# Patient Record
Sex: FEMALE | Race: WHITE | NOT HISPANIC OR LATINO | Employment: OTHER | URBAN - METROPOLITAN AREA
[De-identification: names, ages, dates, MRNs, and addresses within clinical notes are randomized per-mention and may not be internally consistent; named-entity substitution may affect disease eponyms.]

---

## 2017-05-24 ENCOUNTER — OFFICE VISIT (OUTPATIENT)
Dept: FAMILY MEDICINE CLINIC | Facility: CLINIC | Age: 47
End: 2017-05-24

## 2017-05-24 VITALS
HEIGHT: 67 IN | HEART RATE: 76 BPM | BODY MASS INDEX: 26.71 KG/M2 | TEMPERATURE: 97.4 F | DIASTOLIC BLOOD PRESSURE: 88 MMHG | RESPIRATION RATE: 16 BRPM | WEIGHT: 170.2 LBS | SYSTOLIC BLOOD PRESSURE: 130 MMHG

## 2017-05-24 DIAGNOSIS — M54.42 CHRONIC LEFT-SIDED LOW BACK PAIN WITH LEFT-SIDED SCIATICA: ICD-10-CM

## 2017-05-24 DIAGNOSIS — Z72.0 TOBACCO ABUSE: ICD-10-CM

## 2017-05-24 DIAGNOSIS — Z13.29 SCREENING FOR THYROID DISORDER: ICD-10-CM

## 2017-05-24 DIAGNOSIS — F33.2 SEVERE EPISODE OF RECURRENT MAJOR DEPRESSIVE DISORDER, WITHOUT PSYCHOTIC FEATURES (HCC): Primary | ICD-10-CM

## 2017-05-24 DIAGNOSIS — F41.9 ANXIETY: ICD-10-CM

## 2017-05-24 DIAGNOSIS — Z76.89 ENCOUNTER TO ESTABLISH CARE WITH NEW DOCTOR: ICD-10-CM

## 2017-05-24 DIAGNOSIS — Z23 NEED FOR TDAP VACCINATION: ICD-10-CM

## 2017-05-24 DIAGNOSIS — G43.809 OTHER MIGRAINE WITHOUT STATUS MIGRAINOSUS, NOT INTRACTABLE: ICD-10-CM

## 2017-05-24 DIAGNOSIS — Z23 NEED FOR STREPTOCOCCUS PNEUMONIAE VACCINATION: ICD-10-CM

## 2017-05-24 DIAGNOSIS — G25.81 RESTLESS LEG SYNDROME: ICD-10-CM

## 2017-05-24 DIAGNOSIS — G89.29 CHRONIC LEFT-SIDED LOW BACK PAIN WITH LEFT-SIDED SCIATICA: ICD-10-CM

## 2017-05-24 PROCEDURE — 99406 BEHAV CHNG SMOKING 3-10 MIN: CPT | Performed by: FAMILY MEDICINE

## 2017-05-24 PROCEDURE — 99204 OFFICE O/P NEW MOD 45 MIN: CPT | Performed by: FAMILY MEDICINE

## 2017-05-24 RX ORDER — MELOXICAM 7.5 MG/1
7.5 TABLET ORAL 2 TIMES DAILY
COMMUNITY
End: 2017-11-30

## 2017-05-24 RX ORDER — CITALOPRAM 40 MG/1
40 TABLET ORAL DAILY
Qty: 30 TABLET | Refills: 2 | Status: SHIPPED | OUTPATIENT
Start: 2017-05-24 | End: 2017-10-19

## 2017-05-24 RX ORDER — PRAMIPEXOLE DIHYDROCHLORIDE 0.5 MG/1
0.5 TABLET ORAL NIGHTLY
Qty: 30 TABLET | Refills: 2 | Status: SHIPPED | OUTPATIENT
Start: 2017-05-24 | End: 2018-11-08

## 2017-05-24 RX ORDER — SUMATRIPTAN 100 MG/1
100 TABLET, FILM COATED ORAL
COMMUNITY
End: 2018-11-08

## 2017-05-24 RX ORDER — PRAMIPEXOLE DIHYDROCHLORIDE 0.5 MG/1
0.5 TABLET ORAL NIGHTLY
COMMUNITY
End: 2017-05-24 | Stop reason: SDUPTHER

## 2017-05-24 RX ORDER — CITALOPRAM 20 MG/1
20 TABLET ORAL DAILY
COMMUNITY
End: 2017-05-24 | Stop reason: SDUPTHER

## 2017-05-24 RX ORDER — GABAPENTIN 300 MG/1
300 CAPSULE ORAL 3 TIMES DAILY
COMMUNITY
End: 2017-05-24 | Stop reason: SDUPTHER

## 2017-05-24 RX ORDER — GABAPENTIN 300 MG/1
300 CAPSULE ORAL 3 TIMES DAILY
Qty: 90 CAPSULE | Refills: 1 | Status: SHIPPED | OUTPATIENT
Start: 2017-05-24 | End: 2018-11-08

## 2017-06-06 ENCOUNTER — TELEPHONE (OUTPATIENT)
Dept: FAMILY MEDICINE CLINIC | Facility: CLINIC | Age: 47
End: 2017-06-06

## 2017-06-06 DIAGNOSIS — F41.9 ANXIETY: Primary | ICD-10-CM

## 2017-06-06 DIAGNOSIS — F33.2 SEVERE EPISODE OF RECURRENT MAJOR DEPRESSIVE DISORDER, WITHOUT PSYCHOTIC FEATURES (HCC): ICD-10-CM

## 2017-06-06 NOTE — TELEPHONE ENCOUNTER
----- Message from Roseline Au sent at 6/6/2017 12:21 PM EDT -----  Contact: jose l banks  PATIENT CALLED TO request a refill on her xanax 2 mg 2  X a day [facility] [place]. She stated she had discussed taking over the prescription for this patient can be reached for questions

## 2017-06-07 NOTE — TELEPHONE ENCOUNTER
I'm not taking over this prescription. She was a previous Dr. Medel patient and according to Marlon report, she hadn't received it since Jan 2017 and I don't have any records from 's office, so unsure if this was stopped since it has been so many months since she got the prescription. She will need to see psychiatry if she requires treatment with benzodiazepine (Xanax) medication. JJ

## 2017-07-31 ENCOUNTER — RESULTS ENCOUNTER (OUTPATIENT)
Dept: FAMILY MEDICINE CLINIC | Facility: CLINIC | Age: 47
End: 2017-07-31

## 2017-07-31 DIAGNOSIS — Z76.89 ENCOUNTER TO ESTABLISH CARE WITH NEW DOCTOR: ICD-10-CM

## 2017-07-31 DIAGNOSIS — Z13.29 SCREENING FOR THYROID DISORDER: ICD-10-CM

## 2017-07-31 DIAGNOSIS — Z23 NEED FOR STREPTOCOCCUS PNEUMONIAE VACCINATION: ICD-10-CM

## 2017-07-31 DIAGNOSIS — Z72.0 TOBACCO ABUSE: ICD-10-CM

## 2017-07-31 DIAGNOSIS — F33.2 SEVERE EPISODE OF RECURRENT MAJOR DEPRESSIVE DISORDER, WITHOUT PSYCHOTIC FEATURES (HCC): ICD-10-CM

## 2017-07-31 DIAGNOSIS — Z23 NEED FOR TDAP VACCINATION: ICD-10-CM

## 2017-07-31 DIAGNOSIS — G25.81 RESTLESS LEG SYNDROME: ICD-10-CM

## 2017-10-04 ENCOUNTER — HOSPITAL ENCOUNTER (OUTPATIENT)
Dept: GENERAL RADIOLOGY | Facility: HOSPITAL | Age: 47
Discharge: HOME | End: 2017-10-04
Admitting: NURSE PRACTITIONER

## 2017-10-04 ENCOUNTER — TRANSCRIBE ORDERS (OUTPATIENT)
Dept: LAB | Facility: HOSPITAL | Age: 47
End: 2017-10-04

## 2017-10-04 ENCOUNTER — HOSPITAL ENCOUNTER (OUTPATIENT)
Dept: GENERAL RADIOLOGY | Facility: HOSPITAL | Age: 47
Discharge: HOME | End: 2017-10-04

## 2017-10-04 DIAGNOSIS — R52 PAIN: Primary | ICD-10-CM

## 2017-10-04 DIAGNOSIS — R52 PAIN: ICD-10-CM

## 2017-10-04 PROCEDURE — 72070 X-RAY EXAM THORAC SPINE 2VWS: CPT

## 2017-10-04 PROCEDURE — 72100 X-RAY EXAM L-S SPINE 2/3 VWS: CPT

## 2017-10-18 DIAGNOSIS — M79.671 RIGHT FOOT PAIN: Primary | ICD-10-CM

## 2017-10-19 ENCOUNTER — OFFICE VISIT (OUTPATIENT)
Dept: ORTHOPEDIC SURGERY | Facility: CLINIC | Age: 47
End: 2017-10-19

## 2017-10-19 VITALS — BODY MASS INDEX: 26.53 KG/M2 | RESPIRATION RATE: 16 BRPM | WEIGHT: 169 LBS | HEIGHT: 67 IN

## 2017-10-19 DIAGNOSIS — M20.11 HALLUX VALGUS OF RIGHT FOOT: Primary | ICD-10-CM

## 2017-10-19 DIAGNOSIS — M77.51 BURSITIS OF RIGHT FOOT: ICD-10-CM

## 2017-10-19 PROCEDURE — 99204 OFFICE O/P NEW MOD 45 MIN: CPT | Performed by: PODIATRIST

## 2017-10-19 PROCEDURE — 20600 DRAIN/INJ JOINT/BURSA W/O US: CPT | Performed by: PODIATRIST

## 2017-10-19 RX ORDER — LIDOCAINE HYDROCHLORIDE 10 MG/ML
0.5 INJECTION, SOLUTION INFILTRATION; PERINEURAL
Status: COMPLETED | OUTPATIENT
Start: 2017-10-19 | End: 2017-10-19

## 2017-10-19 RX ORDER — SERTRALINE HYDROCHLORIDE 100 MG/1
100 TABLET, FILM COATED ORAL DAILY
COMMUNITY
End: 2018-11-08 | Stop reason: SDUPTHER

## 2017-10-19 RX ORDER — BENZONATATE 200 MG/1
200 CAPSULE ORAL 3 TIMES DAILY PRN
COMMUNITY
End: 2018-11-08

## 2017-10-19 RX ORDER — TRIAMCINOLONE ACETONIDE 40 MG/ML
40 INJECTION, SUSPENSION INTRA-ARTICULAR; INTRAMUSCULAR
Status: COMPLETED | OUTPATIENT
Start: 2017-10-19 | End: 2017-10-19

## 2017-10-19 RX ORDER — BUPIVACAINE HYDROCHLORIDE 5 MG/ML
0.5 INJECTION, SOLUTION PERINEURAL
Status: COMPLETED | OUTPATIENT
Start: 2017-10-19 | End: 2017-10-19

## 2017-10-19 RX ADMIN — LIDOCAINE HYDROCHLORIDE 0.5 ML: 10 INJECTION, SOLUTION INFILTRATION; PERINEURAL at 10:54

## 2017-10-19 RX ADMIN — BUPIVACAINE HYDROCHLORIDE 0.5 ML: 5 INJECTION, SOLUTION PERINEURAL at 10:54

## 2017-10-19 RX ADMIN — TRIAMCINOLONE ACETONIDE 40 MG: 40 INJECTION, SUSPENSION INTRA-ARTICULAR; INTRAMUSCULAR at 10:54

## 2017-10-19 NOTE — PROGRESS NOTES
Subjective   Patient ID: Shruthi Boone is a 47 y.o. female   Bunions of the Right Foot and Establish Care  She comes in today as a referral from her primary care physician.  She tells me that about a month ago this large bump/spot on her right great toe developed.  She says it's most part much constant aching and throbbing pain.  She says it's worse when she's walking on it or something touching it or pushing against it.  She says she's tried ice and heat and over-the-counter medications as well as icy hot and other topicals and nothing has helped.  She says she's even tried wearing her 's be[employer]ungles Jungles boots and wide shoes.  To this point her only [facility] relief has been wearing open toed shoes that don't rub on it.  She's had no previous treatment.    History of Present Illness    Pain Score: 9  Pain Location: Foot  Pain Orientation: Right     Pain Descriptors: Aching, Throbbing  Pain Frequency: Constant/continuous     Date Pain First Started: 08/15/17  Clinical Progression: Gradually worsening  Aggravating Factors: Walking, Standing        Pain Intervention(s): Medication (See MAR)  Result of Injury: No  Work-Related Injury: No    Review of Systems   Constitutional: Positive for unexpected weight change.   HENT: Positive for sore throat.    Eyes: Negative.    Respiratory: Positive for shortness of breath.    Cardiovascular: Negative.    Gastrointestinal: Negative.    Endocrine: Negative.    Genitourinary: Negative.    Musculoskeletal: Negative.    Skin: Negative.    Allergic/Immunologic: Negative.    Neurological: Positive for headaches.   Hematological: Negative.    Psychiatric/Behavioral: Negative.        Past Medical History:   Diagnosis Date   • Arthritis    • Asthma    • Depression    • GERD (gastroesophageal reflux disease)    • Hyperlipemia    • Spine pain         Past Surgical History:   Procedure Laterality Date   • TUBAL ABDOMINAL LIGATION         Allergies   Allergen Reactions   • Methadone GI  Intolerance   • Aspirin Rash       Family History   Problem Relation Age of Onset   • No Known Problems Mother    • Heart disease Father    • Hyperlipidemia Father    • Liver disease Father    • Thyroid disease Father    • Hypertension Father    • Kidney disease Father    • Stroke Father    • Heart attack Father    • Arthritis Father    • Diabetes Sister    • Diabetes Maternal Grandmother    • Cancer Maternal Grandmother      throat & lung       Social History     Social History   • Marital status:      Spouse name: N/A   • Number of children: N/A   • Years of education: N/A     Occupational History   • Not on file.     Social History Main Topics   • Smoking status: Current Every Day Smoker   • Smokeless tobacco: Never Used   • Alcohol use No   • Drug use: No   • Sexual activity: Not on file     Other Topics Concern   • Not on file     Social History Narrative       Ready to quit: No  Counseling given: Yes       All the above social hx, family hx, surgical history,medications, allergies, ros & HPI reviewed.  Objective   Physical Exam   Constitutional: She is oriented to person, place, and time. She appears well-developed and well-nourished.   HENT:   Head: Normocephalic and atraumatic.   Eyes: EOM are normal. Pupils are equal, round, and reactive to light.   Neck: Normal range of motion.   Cardiovascular: Normal rate.    Pulmonary/Chest: Effort normal.   Abdominal: Soft.   Musculoskeletal: Normal range of motion.   Neurological: She is alert and oriented to person, place, and time. She has normal reflexes.   Skin: Skin is warm.   Psychiatric: She has a normal mood and affect. Her behavior is normal. Judgment and thought content normal.   Nursing note and vitals reviewed.    Right Ankle Exam   Right ankle exam is normal.    Range of Motion   The patient has normal right ankle ROM.    Muscle Strength   The patient has normal right ankle strength.  Other   Erythema: present  Sensation: normal  Pulse: present      Comments:  Rt LE is NVIS  PULSES 2/4  There is lateral deviation of the hallux and the 1st mpj is prominent medially.  There is ttp at the medial mpj.  Mild pain with rom of the 1st mpj  The hallux tracks laterally but is not track bound.          Right Ankle Exam     Range of Motion   Normal right ankle ROM    Muscle Strength   Normal right ankle strengthComments  Rt LE is NVIS  PULSES 2/4  There is lateral deviation of the hallux and the 1st mpj is prominent medially.  There is ttp at the medial mpj.  Mild pain with rom of the 1st mpj  The hallux tracks laterally but is not track bound.        There is hypermobility of the first ray but upon weightbearing she maintains a medial longitudinal arch.  There appears to be a slightly inflamed swollen and erythematous bursal sac over the medial first MTPJ as well.  No pain under the second MTPJ.    Assessment/Plan right hallux valgus  Independent Review of Radiographic Studies:      Laboratory and Other Studies:     Medical Decision Making:        Small Joint Arthrocentesis  Consent given by: patient  Site marked: site marked  Timeout: Immediately prior to procedure a time out was called to verify the correct patient, procedure, equipment, support staff and site/side marked as required   Supporting Documentation  Indications: pain, joint swelling and diagnostic evaluation   Procedure Details  Location: great toe - R great MTP  Needle size: 25 G  Approach: dorsal  Medications administered: 0.5 mL lidocaine 1 %; 40 mg triamcinolone acetonide 40 MG/ML; 0.5 mL bupivacaine (Dexamethasone 10mg/ml 0.5ml NDC: 8117-1733-30 LOT: [id number] EXP: 05/01/2019)  Patient tolerance: patient tolerated the procedure well with no immediate complications        [] No procedures were performed in office today.    Shruthi was seen today for establish care and bunions.    Diagnoses and all orders for this visit:    Hallux valgus of right foot  -     Small Joint Arthrocentesis    Bursitis of  right foot  -     Small Joint Arthrocentesis    Other orders  -     diclofenac (VOLTAREN) 1 % gel gel; Apply 4 g topically 4 (Four) Times a Day As Needed (pain).          Recommendations/Plan:  I discussed the pathology and etiology of her deformity and pain.  I discussed conservative and surgical treatment options with her.  For now I recommend she try open toed or wide shoes.  I'll prescribe her topical Voltaren gel and recommend she try this 3-4 times a day.  I also discussed that due to the swollen bursal sac I recommend an injection and would see how this does.  Hopefully this will give her some symptomatic relief.  If not I explained we can further discuss surgery but did advise her of the basics of the bunion surgery and its recovery.  Follow-up in 3 weeks.    Return in about 3 weeks (around 11/9/2017).  Patient agreeable to call or return sooner for any concerns.

## 2017-11-30 ENCOUNTER — APPOINTMENT (OUTPATIENT)
Dept: PREADMISSION TESTING | Facility: HOSPITAL | Age: 47
End: 2017-11-30

## 2017-11-30 ENCOUNTER — OFFICE VISIT (OUTPATIENT)
Dept: ORTHOPEDIC SURGERY | Facility: CLINIC | Age: 47
End: 2017-11-30

## 2017-11-30 VITALS — WEIGHT: 169 LBS | RESPIRATION RATE: 18 BRPM | HEIGHT: 67 IN | BODY MASS INDEX: 26.53 KG/M2

## 2017-11-30 VITALS — HEIGHT: 67 IN | WEIGHT: 169 LBS | BODY MASS INDEX: 26.53 KG/M2

## 2017-11-30 DIAGNOSIS — M20.11 HALLUX VALGUS OF RIGHT FOOT: ICD-10-CM

## 2017-11-30 DIAGNOSIS — M77.51 BURSITIS OF RIGHT FOOT: ICD-10-CM

## 2017-11-30 DIAGNOSIS — M20.11 HALLUX VALGUS OF RIGHT FOOT: Primary | ICD-10-CM

## 2017-11-30 LAB
ALBUMIN SERPL-MCNC: 4.9 G/DL (ref 3.5–5)
ALBUMIN/GLOB SERPL: 1.5 G/DL (ref 1–2)
ALP SERPL-CCNC: 78 U/L (ref 38–126)
ALT SERPL W P-5'-P-CCNC: 23 U/L (ref 13–69)
ANION GAP SERPL CALCULATED.3IONS-SCNC: 18.5 MMOL/L
AST SERPL-CCNC: 20 U/L (ref 15–46)
B-HCG UR QL: NEGATIVE
BASOPHILS # BLD AUTO: 0.05 10*3/MM3 (ref 0–0.2)
BASOPHILS NFR BLD AUTO: 0.4 % (ref 0–2.5)
BILIRUB SERPL-MCNC: 0.5 MG/DL (ref 0.2–1.3)
BUN BLD-MCNC: 13 MG/DL (ref 7–20)
BUN/CREAT SERPL: 14.4 (ref 7.1–23.5)
CALCIUM SPEC-SCNC: 10.1 MG/DL (ref 8.4–10.2)
CHLORIDE SERPL-SCNC: 108 MMOL/L (ref 98–107)
CO2 SERPL-SCNC: 26 MMOL/L (ref 26–30)
CREAT BLD-MCNC: 0.9 MG/DL (ref 0.6–1.3)
DEPRECATED RDW RBC AUTO: 55.2 FL (ref 37–54)
EOSINOPHIL # BLD AUTO: 0.18 10*3/MM3 (ref 0–0.7)
EOSINOPHIL NFR BLD AUTO: 1.5 % (ref 0–7)
ERYTHROCYTE [DISTWIDTH] IN BLOOD BY AUTOMATED COUNT: 19.4 % (ref 11.5–14.5)
GFR SERPL CREATININE-BSD FRML MDRD: 67 ML/MIN/1.73
GLOBULIN UR ELPH-MCNC: 3.2 GM/DL
GLUCOSE BLD-MCNC: 105 MG/DL (ref 74–98)
HCT VFR BLD AUTO: 38.8 % (ref 37–47)
HGB BLD-MCNC: 11.9 G/DL (ref 12–16)
IMM GRANULOCYTES # BLD: 0.07 10*3/MM3 (ref 0–0.06)
IMM GRANULOCYTES NFR BLD: 0.6 % (ref 0–0.6)
LYMPHOCYTES # BLD AUTO: 1.75 10*3/MM3 (ref 0.6–3.4)
LYMPHOCYTES NFR BLD AUTO: 14.4 % (ref 10–50)
MCH RBC QN AUTO: 24.3 PG (ref 27–31)
MCHC RBC AUTO-ENTMCNC: 30.7 G/DL (ref 30–37)
MCV RBC AUTO: 79.2 FL (ref 81–99)
MONOCYTES # BLD AUTO: 0.83 10*3/MM3 (ref 0–0.9)
MONOCYTES NFR BLD AUTO: 6.8 % (ref 0–12)
NEUTROPHILS # BLD AUTO: 9.26 10*3/MM3 (ref 2–6.9)
NEUTROPHILS NFR BLD AUTO: 76.3 % (ref 37–80)
NRBC BLD MANUAL-RTO: 0 /100 WBC (ref 0–0)
PLATELET # BLD AUTO: 348 10*3/MM3 (ref 130–400)
PMV BLD AUTO: 10.2 FL (ref 6–12)
POTASSIUM BLD-SCNC: 4.5 MMOL/L (ref 3.5–5.1)
PROT SERPL-MCNC: 8.1 G/DL (ref 6.3–8.2)
RBC # BLD AUTO: 4.9 10*6/MM3 (ref 4.2–5.4)
SODIUM BLD-SCNC: 148 MMOL/L (ref 137–145)
WBC NRBC COR # BLD: 12.14 10*3/MM3 (ref 4.8–10.8)

## 2017-11-30 PROCEDURE — 85025 COMPLETE CBC W/AUTO DIFF WBC: CPT | Performed by: PODIATRIST

## 2017-11-30 PROCEDURE — 81025 URINE PREGNANCY TEST: CPT | Performed by: PODIATRIST

## 2017-11-30 PROCEDURE — 99213 OFFICE O/P EST LOW 20 MIN: CPT | Performed by: PODIATRIST

## 2017-11-30 PROCEDURE — 80053 COMPREHEN METABOLIC PANEL: CPT | Performed by: PODIATRIST

## 2017-11-30 PROCEDURE — 36415 COLL VENOUS BLD VENIPUNCTURE: CPT

## 2017-11-30 PROCEDURE — 93005 ELECTROCARDIOGRAM TRACING: CPT | Performed by: PODIATRIST

## 2017-11-30 RX ORDER — ALBUTEROL SULFATE 90 UG/1
2 AEROSOL, METERED RESPIRATORY (INHALATION) EVERY 4 HOURS PRN
COMMUNITY
End: 2018-11-08

## 2017-11-30 RX ORDER — SODIUM CHLORIDE, SODIUM LACTATE, POTASSIUM CHLORIDE, CALCIUM CHLORIDE 600; 310; 30; 20 MG/100ML; MG/100ML; MG/100ML; MG/100ML
100 INJECTION, SOLUTION INTRAVENOUS CONTINUOUS
Status: CANCELLED | OUTPATIENT
Start: 2017-11-30

## 2017-11-30 NOTE — PROGRESS NOTES
Subjective   Patient ID: Shruthi Boone is a 47 y.o. female   Bunions, Follow-up, and Pain of the Right Foot  She returns today complaining of continued right foot pain.  She has a new skin reaction/rash over the dorsal foot.  She states the injection I gave her last time did not help.  She states she's in continued pain and wishes to have something done with this.    History of Present Illness                                                   Review of Systems   Constitutional: Negative for chills, fatigue and fever.   Respiratory: Negative for cough.    Cardiovascular: Negative for chest pain.   Gastrointestinal: Negative for abdominal pain, diarrhea, nausea and vomiting.   Hematological: Bruises/bleeds easily.       Past Medical History:   Diagnosis Date   • Arthritis    • Asthma    • Depression    • GERD (gastroesophageal reflux disease)    • Hyperlipemia    • Spine pain         Past Surgical History:   Procedure Laterality Date   • TUBAL ABDOMINAL LIGATION         Allergies   Allergen Reactions   • Methadone GI Intolerance   • Aspirin Rash       Family History   Problem Relation Age of Onset   • No Known Problems Mother    • Heart disease Father    • Hyperlipidemia Father    • Liver disease Father    • Thyroid disease Father    • Hypertension Father    • Kidney disease Father    • Stroke Father    • Heart attack Father    • Arthritis Father    • Diabetes Sister    • Diabetes Maternal Grandmother    • Cancer Maternal Grandmother      throat & lung       Social History     Social History   • Marital status:      Spouse name: N/A   • Number of children: N/A   • Years of education: N/A     Occupational History   • Not on file.     Social History Main Topics   • Smoking status: Current Every Day Smoker   • Smokeless tobacco: Never Used   • Alcohol use No   • Drug use: No   • Sexual activity: Not on file     Other Topics Concern   • Not on file     Social History Narrative       Ready to quit: No  Counseling  given: Yes       I have reviewed all of the above social hx, family hx, surgical hx, medications, allergies & ROS and confirm that it is accurate.  Objective   Physical Exam   Constitutional: She is oriented to person, place, and time. She appears well-developed and well-nourished.   HENT:   Head: Normocephalic and atraumatic.   Eyes: EOM are normal. Pupils are equal, round, and reactive to light.   Neck: Normal range of motion.   Cardiovascular: Normal rate.    Pulmonary/Chest: Effort normal.   Abdominal: Soft.   Musculoskeletal: Normal range of motion.   Neurological: She is alert and oriented to person, place, and time. She has normal reflexes.   Skin: Skin is warm.   Psychiatric: She has a normal mood and affect. Her behavior is normal. Judgment and thought content normal.   Nursing note reviewed.    Right Ankle Exam   Right ankle exam is normal.    Range of Motion   The patient has normal right ankle ROM.    Muscle Strength   The patient has normal right ankle strength.  Other   Erythema: present  Sensation: normal  Pulse: present     Comments:  Rt LE is NVIS  PULSES 2/4  There is lateral deviation of the hallux and the 1st mpj is prominent medially.  There is ttp at the medial mpj.  Mild pain with rom of the 1st mpj  The hallux tracks laterally but is not track bound.          Right Ankle Exam     Range of Motion   Normal right ankle ROM    Muscle Strength   Normal right ankle strengthComments  Rt LE is NVIS  PULSES 2/4  There is lateral deviation of the hallux and the 1st mpj is prominent medially.  There is ttp at the medial mpj.  Mild pain with rom of the 1st mpj  The hallux tracks laterally but is not track bound.        There is slight hypermobility of the first ray.  No significant swelling erythema and ecchymosis over the dorsomedial first MTPJ with pain.  sHe has a nonraised slightly red erythematous dry patch over the dorsal second MTPJ.  She complains of no pain.  Complains of no itching.  States  she's not sure where it came from.  Assessment/Plan right hallux valgus  Independent Review of Radiographic Studies:      Laboratory and Other Studies:     Medical Decision Making:        Procedures  [] No procedures were performed in office today.    Shruthi was seen today for bunions, follow-up and pain.    Diagnoses and all orders for this visit:    Hallux valgus of right foot  -     Case Request; Standing  -     CBC and Differential; Future  -     Comprehensive metabolic panel; Future  -     lactated ringers infusion; Infuse 100 mL/hr into a venous catheter Continuous.  -     ceFAZolin (ANCEF) 2 g in sodium chloride 0.9 % 100 mL IVPB; Infuse 2 g into a venous catheter 1 (One) Time.  -     Case Request    Bursitis of right foot    Other orders  -     Follow Anesthesia Guidelines / Standing Orders; Future  -     Provide instructions to patient regarding NPO status  -     Obtain informed consent  -     Clorhexidine skin prep  -     Follow Anesthesia Guidelines / Standing Orders; Standing  -     Verify NPO Status; Standing  -     ALFREDO hose- To be placed on patient in pre-op; Standing  -     SCD (sequential compression device)- to be placed on patient in Pre-op; Standing  -     Obtain informed consent (if not collected inpatient or PAT); Standing        Recommendations/Plan:  I reviewed the x-rays with the patient and discussed conservative as well as surgical treatment options.  At this point they seem as if they have exhausted conservative options consisting of activity modifications, padding, anti-inflammatory medications, and shoe gear changes.  They're requesting and elected to proceed with surgical intervention.  We discussed all the risks first benefits and potential complications of the procedure including bleeding, nerve damage, infection, need for further surgery, malunion, nonunion, delayed union, DVT, and even death.  All there questions were answered, consent was obtained,. we'll plan on proceeding with  bunionectomy.  I explained that given the bony alignment I would at least plan to proceed with opening base wedge osteotomy but she may also need a reverdin and type osteotomy/distal osteotomy or a can.    We discussed all the additional risks and potential complications of surgery including bleeding, infection, nerve damage, need for further surgery, delayed union or nonunion, possible revisional surgery, hardware complication or breakage or failure, potential need for hardware or fixation removal, DVT and PE, delayed wound healing and wound complications, prolonged swelling, loss of limb and even death.      Return post op, xoa.  Patient agreeable to call or return sooner for any concerns.

## 2017-12-04 ENCOUNTER — ANESTHESIA (OUTPATIENT)
Dept: PERIOP | Facility: HOSPITAL | Age: 47
End: 2017-12-04

## 2017-12-04 ENCOUNTER — APPOINTMENT (OUTPATIENT)
Dept: GENERAL RADIOLOGY | Facility: HOSPITAL | Age: 47
End: 2017-12-04

## 2017-12-04 ENCOUNTER — ANESTHESIA EVENT (OUTPATIENT)
Dept: PERIOP | Facility: HOSPITAL | Age: 47
End: 2017-12-04

## 2017-12-04 ENCOUNTER — HOSPITAL ENCOUNTER (OUTPATIENT)
Facility: HOSPITAL | Age: 47
Setting detail: HOSPITAL OUTPATIENT SURGERY
Discharge: HOME | End: 2017-12-04
Attending: PODIATRIST | Admitting: PODIATRIST

## 2017-12-04 VITALS
OXYGEN SATURATION: 98 % | RESPIRATION RATE: 16 BRPM | SYSTOLIC BLOOD PRESSURE: 143 MMHG | DIASTOLIC BLOOD PRESSURE: 86 MMHG | TEMPERATURE: 97 F | HEART RATE: 79 BPM

## 2017-12-04 DIAGNOSIS — M20.11 HALLUX VALGUS OF RIGHT FOOT: ICD-10-CM

## 2017-12-04 LAB — GLUCOSE BLDC GLUCOMTR-MCNC: 92 MG/DL (ref 70–130)

## 2017-12-04 PROCEDURE — C1713 ANCHOR/SCREW BN/BN,TIS/BN: HCPCS | Performed by: PODIATRIST

## 2017-12-04 PROCEDURE — 76000 FLUOROSCOPY <1 HR PHYS/QHP: CPT

## 2017-12-04 PROCEDURE — 25010000002 FENTANYL CITRATE (PF) 100 MCG/2ML SOLUTION: Performed by: NURSE ANESTHETIST, CERTIFIED REGISTERED

## 2017-12-04 PROCEDURE — 82962 GLUCOSE BLOOD TEST: CPT

## 2017-12-04 PROCEDURE — 25010000002 DEXAMETHASONE SODIUM PHOSPHATE 10 MG/ML SOLUTION: Performed by: NURSE ANESTHETIST, CERTIFIED REGISTERED

## 2017-12-04 PROCEDURE — 25010000002 DEXAMETHASONE PER 1 MG: Performed by: NURSE ANESTHETIST, CERTIFIED REGISTERED

## 2017-12-04 PROCEDURE — 25010000002 PROPOFOL 1000 MG/ML EMULSION: Performed by: NURSE ANESTHETIST, CERTIFIED REGISTERED

## 2017-12-04 PROCEDURE — 25010000003 CEFAZOLIN PER 500 MG: Performed by: PODIATRIST

## 2017-12-04 PROCEDURE — 28299 COR HLX VLGS DOUBLE OSTEOT: CPT | Performed by: PODIATRIST

## 2017-12-04 PROCEDURE — 25010000002 ONDANSETRON PER 1 MG: Performed by: NURSE ANESTHETIST, CERTIFIED REGISTERED

## 2017-12-04 PROCEDURE — 25010000002 MIDAZOLAM PER 1 MG: Performed by: NURSE ANESTHETIST, CERTIFIED REGISTERED

## 2017-12-04 DEVICE — OLIVE WIRE, SMOOTH, 1.4MM
Type: IMPLANTABLE DEVICE | Site: FOOT | Status: FUNCTIONAL
Brand: BABY GORILLA/GORILLA PLATING SYSTEM

## 2017-12-04 DEVICE — 3.5 X 14 MM R3CON LOCKING PLATE SCREW
Type: IMPLANTABLE DEVICE | Site: FOOT | Status: FUNCTIONAL
Brand: GORILLA PLATING SYSTEM

## 2017-12-04 DEVICE — BOW & ARROW, BASE WEDGE PLATE, 4.0MM WEDGE
Type: IMPLANTABLE DEVICE | Site: FOOT | Status: FUNCTIONAL
Brand: GORILLA PLATING SYSTEM

## 2017-12-04 DEVICE — 2.7 X 15 MM R3CON LOCKING PLATE SCREW
Type: IMPLANTABLE DEVICE | Site: FOOT | Status: FUNCTIONAL
Brand: GORILLA PLATING SYSTEM

## 2017-12-04 DEVICE — 2.7 X 22 MM R3CON LOCKING PLATE SCREW
Type: IMPLANTABLE DEVICE | Site: FOOT | Status: FUNCTIONAL
Brand: GORILLA PLATING SYSTEM

## 2017-12-04 DEVICE — 2.7 X 20 MM R3CON LOCKING PLATE SCREW
Type: IMPLANTABLE DEVICE | Site: FOOT | Status: FUNCTIONAL
Brand: GORILLA PLATING SYSTEM

## 2017-12-04 RX ORDER — BUPIVACAINE HYDROCHLORIDE 5 MG/ML
INJECTION, SOLUTION EPIDURAL; INTRACAUDAL
Status: DISPENSED
Start: 2017-12-04 | End: 2017-12-04

## 2017-12-04 RX ORDER — DEXAMETHASONE SODIUM PHOSPHATE 10 MG/ML
INJECTION, SOLUTION INTRAMUSCULAR; INTRAVENOUS AS NEEDED
Status: DISCONTINUED | OUTPATIENT
Start: 2017-12-04 | End: 2017-12-04 | Stop reason: SURG

## 2017-12-04 RX ORDER — ONDANSETRON 2 MG/ML
INJECTION INTRAMUSCULAR; INTRAVENOUS AS NEEDED
Status: DISCONTINUED | OUTPATIENT
Start: 2017-12-04 | End: 2017-12-04 | Stop reason: SURG

## 2017-12-04 RX ORDER — ONDANSETRON 2 MG/ML
4 INJECTION INTRAMUSCULAR; INTRAVENOUS ONCE AS NEEDED
Status: CANCELLED | OUTPATIENT
Start: 2017-12-04

## 2017-12-04 RX ORDER — SODIUM CHLORIDE 9 MG/ML
INJECTION, SOLUTION INTRAVENOUS CONTINUOUS PRN
Status: DISCONTINUED | OUTPATIENT
Start: 2017-12-04 | End: 2017-12-04 | Stop reason: SURG

## 2017-12-04 RX ORDER — MIDAZOLAM HYDROCHLORIDE 1 MG/ML
INJECTION INTRAMUSCULAR; INTRAVENOUS AS NEEDED
Status: DISCONTINUED | OUTPATIENT
Start: 2017-12-04 | End: 2017-12-04 | Stop reason: SURG

## 2017-12-04 RX ORDER — SODIUM CHLORIDE 0.9 % (FLUSH) 0.9 %
3 SYRINGE (ML) INJECTION AS NEEDED
Status: DISCONTINUED | OUTPATIENT
Start: 2017-12-04 | End: 2017-12-04 | Stop reason: HOSPADM

## 2017-12-04 RX ORDER — SODIUM CHLORIDE, SODIUM LACTATE, POTASSIUM CHLORIDE, CALCIUM CHLORIDE 600; 310; 30; 20 MG/100ML; MG/100ML; MG/100ML; MG/100ML
100 INJECTION, SOLUTION INTRAVENOUS CONTINUOUS
Status: DISCONTINUED | OUTPATIENT
Start: 2017-12-04 | End: 2017-12-20 | Stop reason: HOSPADM

## 2017-12-04 RX ORDER — DEXAMETHASONE SODIUM PHOSPHATE 10 MG/ML
INJECTION, SOLUTION INTRAMUSCULAR; INTRAVENOUS
Status: DISPENSED
Start: 2017-12-04 | End: 2017-12-04

## 2017-12-04 RX ORDER — BUPIVACAINE HYDROCHLORIDE 5 MG/ML
INJECTION, SOLUTION EPIDURAL; INTRACAUDAL AS NEEDED
Status: DISCONTINUED | OUTPATIENT
Start: 2017-12-04 | End: 2017-12-04 | Stop reason: SURG

## 2017-12-04 RX ORDER — DEXAMETHASONE SODIUM PHOSPHATE 4 MG/ML
INJECTION, SOLUTION INTRA-ARTICULAR; INTRALESIONAL; INTRAMUSCULAR; INTRAVENOUS; SOFT TISSUE AS NEEDED
Status: DISCONTINUED | OUTPATIENT
Start: 2017-12-04 | End: 2017-12-04 | Stop reason: SURG

## 2017-12-04 RX ORDER — FENTANYL CITRATE 50 UG/ML
INJECTION, SOLUTION INTRAMUSCULAR; INTRAVENOUS
Status: DISPENSED
Start: 2017-12-04 | End: 2017-12-04

## 2017-12-04 RX ORDER — OXYCODONE AND ACETAMINOPHEN 7.5; 325 MG/1; MG/1
1 TABLET ORAL EVERY 6 HOURS PRN
Qty: 30 TABLET | Refills: 0 | Status: SHIPPED | OUTPATIENT
Start: 2017-12-04 | End: 2017-12-21 | Stop reason: SDUPTHER

## 2017-12-04 RX ORDER — FENTANYL CITRATE 50 UG/ML
INJECTION, SOLUTION INTRAMUSCULAR; INTRAVENOUS AS NEEDED
Status: DISCONTINUED | OUTPATIENT
Start: 2017-12-04 | End: 2017-12-04 | Stop reason: SURG

## 2017-12-04 RX ORDER — MIDAZOLAM HYDROCHLORIDE 1 MG/ML
INJECTION INTRAMUSCULAR; INTRAVENOUS
Status: DISPENSED
Start: 2017-12-04 | End: 2017-12-04

## 2017-12-04 RX ORDER — MEPERIDINE HYDROCHLORIDE 50 MG/ML
50 INJECTION INTRAMUSCULAR; INTRAVENOUS; SUBCUTANEOUS ONCE
Status: CANCELLED | OUTPATIENT
Start: 2017-12-04 | End: 2017-12-04

## 2017-12-04 RX ADMIN — BUPIVACAINE HYDROCHLORIDE 30 ML: 5 INJECTION, SOLUTION EPIDURAL; INTRACAUDAL; PERINEURAL at 08:28

## 2017-12-04 RX ADMIN — DEXAMETHASONE SODIUM PHOSPHATE 8 MG: 4 INJECTION, SOLUTION INTRAMUSCULAR; INTRAVENOUS at 09:25

## 2017-12-04 RX ADMIN — MIDAZOLAM HYDROCHLORIDE 2 MG: 1 INJECTION, SOLUTION INTRAMUSCULAR; INTRAVENOUS at 08:25

## 2017-12-04 RX ADMIN — FENTANYL CITRATE 100 MCG: 50 INJECTION, SOLUTION INTRAMUSCULAR; INTRAVENOUS at 08:25

## 2017-12-04 RX ADMIN — SODIUM CHLORIDE: 9 INJECTION, SOLUTION INTRAVENOUS at 10:40

## 2017-12-04 RX ADMIN — PROPOFOL 100 MCG/KG/MIN: 10 INJECTION, EMULSION INTRAVENOUS at 09:09

## 2017-12-04 RX ADMIN — ONDANSETRON 4 MG: 2 INJECTION INTRAMUSCULAR; INTRAVENOUS at 09:25

## 2017-12-04 RX ADMIN — DEXAMETHASONE SODIUM PHOSPHATE 5 MG: 10 INJECTION, SOLUTION INTRAMUSCULAR; INTRAVENOUS at 08:28

## 2017-12-04 RX ADMIN — FENTANYL CITRATE 50 MCG: 50 INJECTION, SOLUTION INTRAMUSCULAR; INTRAVENOUS at 09:06

## 2017-12-04 RX ADMIN — SODIUM CHLORIDE, POTASSIUM CHLORIDE, SODIUM LACTATE AND CALCIUM CHLORIDE 100 ML/HR: 600; 310; 30; 20 INJECTION, SOLUTION INTRAVENOUS at 08:01

## 2017-12-04 RX ADMIN — Medication 20 MG: at 09:11

## 2017-12-04 RX ADMIN — MIDAZOLAM HYDROCHLORIDE 2 MG: 1 INJECTION, SOLUTION INTRAMUSCULAR; INTRAVENOUS at 09:04

## 2017-12-04 RX ADMIN — FENTANYL CITRATE 50 MCG: 50 INJECTION, SOLUTION INTRAMUSCULAR; INTRAVENOUS at 09:09

## 2017-12-04 RX ADMIN — CEFAZOLIN SODIUM 2 G: 2 SOLUTION INTRAVENOUS at 09:03

## 2017-12-04 NOTE — ANESTHESIA PREPROCEDURE EVALUATION
Anesthesia Evaluation     Patient summary reviewed and Nursing notes reviewed   no history of anesthetic complications:  NPO Solid Status: > 8 hours  NPO Liquid Status: > 8 hours     Airway   Mallampati: I  TM distance: >3 FB  Neck ROM: full  no difficulty expected  Dental - normal exam   (+) edentulous    Pulmonary - normal exam   (+) a smoker Current Abstained day of surgery, COPD mild, asthma, shortness of breath, sleep apnea, decreased breath sounds,   Cardiovascular - normal exam    Rhythm: regular  Rate: normal    (+) [place], hyperlipidemia      Neuro/Psych  (+) headaches (+Migraines), tremors, psychiatric history Anxiety and Depression,    GI/Hepatic/Renal/Endo    (+)  GERD, diabetes mellitus (Pre diabetic) well controlled,     Musculoskeletal     (+) arthralgias, back pain, chronic pain,   Abdominal  - normal exam    Abdomen: soft.  Bowel sounds: normal.   Substance History   (+) alcohol use, drug use (THC abuse)     OB/GYN negative ob/gyn ROS   (-)  Pregnant        Other   (+) arthritis     ROS/Med Hx Other: +RLS  prob tolerance to anes meds d/t substance use  Lab reviewed  ekg sr with [facility]                                Anesthesia Plan    ASA 3     general   (Risks and benefits discussed including risk of aspiration, recall and dental damage. All patient questions answered. Will continue with POC.    GA with LMA    Popliteal single shot PNB  Risk vs Benefits discussed with patient to include infection, bleeding at the site, paresthesia, and incomplete analgesia.  )  intravenous induction   Anesthetic plan and risks discussed with patient.

## 2017-12-04 NOTE — BRIEF OP NOTE
BUNIONECTOMY  Progress Note    Shruthi Boone  12/4/2017    Pre-op Diagnosis:   Hallux valgus of right foot [M20.11]       Post-Op Diagnosis Codes:     * Hallux valgus of right foot [M20.11]    Procedure/CPT® Codes:   1.  Right foot bunionectomy with opening base wedge osteotomy and Rui osteotomy, [id number]    Procedure(s):  Right foot bunionectomy with osteotomy, possible double osteotomy with Akin osteotomy    Surgeon(s):  Reno Mccullough DPM    Anesthesia: Monitor Anesthesia Care    Staff:   Circulator: Km Muñoz RN  Scrub Person: Alma Nascimento; Maame Gray    Estimated Blood Loss: minimal    Urine Voided: * No values recorded between 12/4/2017 12:00 AM and 12/4/2017  9:01 AM *    Specimens:                None      Drains:    none       Findings: per Dictation    Complications: none      Reno Mccullough DPM     Date: 12/4/2017  Time: 9:01 AM

## 2017-12-04 NOTE — NURSING NOTE
0841- DR LUCAS INTO PACU, SPOKE WITH PT AND MARKED SURGICAL SITE WITH PT IDENTIFICATION OF SITE.  0853- HALEIGH OSBORNE CRNA INTO PACU SPOKE WITH PT. PT DROWSY.

## 2017-12-04 NOTE — DISCHARGE INSTRUCTIONS
Rest today  No pushing,pulling,tugging,heavy lifting, or strenuous activity   No major decision making,driving,or drinking alcoholic beverages for 24 hours due to the sedation you received  Always use good hand hygiene/washing technique  No driving on pain medication.    To assist you in voiding:  Drink plenty of fluids  Listen to running water while attempting to void.    If you are unable to urinate and you have an uncomfortable urge to void or it has been   6 hours since you were discharged, return to the Emergency Room.

## 2017-12-04 NOTE — OP NOTE
PREOPERATIVE DIAGNOSIS:   Right Hallux valgus    POSTOPERATIVE DIAGNOSIS: Same    PROCEDURE PERFORMED:  1.  Right foot bunionectomy with opening base wedge osteotomy and Rui osteotomy, [id number]     SURGEON:  Reno Mccullough DPM    ANESTHESIA:  Mac with preoperative regional block per anesthesia    HEMOSTASIS:  Pneumatic right ankle tourniquet at 250 mmHg for 79 minutes    EBL:  Minimal    SPECIMENS:  None    COMPLICATIONS:  None    MATERIALS:  One paragon 4 mm opening base wedge locking plate with 2.7 and 3.5 mm locking and nonlocking screws. One paragon 2 hole 2.0 mm Akin plate with locking screws, 2-0 Vicryl suture, 3-0 nylon suture, 3-0 Vicryl suture    INDICATIONS FOR PROCEDURE:  Ms. Boone is a 47 roll female who presented to my office with a chief complaint of right foot pain.  She states she had seen others in the past and had tried multiple shoe modifications as well as padding and medications orally and topically as well as consider injections.  She complained of constant pain with activity and shoe gear.  She presented requesting surgical intervention.  On physical exam she was noted to be neurovascularly intact to the right foot.  Pulses palpable.  Pedal hair noted.  Sensation intact.  Capillary refill time brisk, slight edema and erythema over the dorsal and medial aspect of the first MTPJ.  There is good range of motion of the hallux with 40° of dorsiflexion dirty degrees plantarflexion.  Manual muscle testing was normal.  Joint range of motion is normal.  Minimal hypermobility of first ray.  The hallux was tracking laterally but not track bound.  There is lateral deviation of the hallux noted.  There is tenderness to palpation and with attempted reduction of the hallux.  Slight pain with range of motion.  X-ray examination showed a mild to moderately increased intermetatarsal angle however there was underlying met adductus masking this deformity.  There is lateral deviation of the hallux.  A prominent  medial eminence was noted.  The joint space for the most part was unremarkable.  No significant degenerative changes.  She did show some slight prominence over the dorsal first metatarsal head.  I'll increase in soft tissue density along the medial first MTPJ.  I discussed additional conservative therapy with her including injections and shoe and orthotic modifications.  She stated she really preferred to have this fixed and surgical intervention.  I discussed a bunionectomy surgery with her.  I recommended proximal osteotomy as well as discussed the potential for distal osteotomy or Akin osteotomy.  We discussed the postoperative recovery and course.  We discussed all the details of the procedure as well as all the risks versus benefits and potential complications including bleeding, infection, nerve damage, need for further surgery, potential need for hardware removal, hardware complication, delayed union or nonunion, anesthetic complications including DVT PE or death.  We discussed delayed wound healing and delayed recovery as well as immobility.  She took this all under advisement and agreed to proceed with surgery.  Consent was obtained.  All her questions were answered.  She was seen preoperatively today and the correct surgical side was marked.  She was given antibiotics preoperatively per protocol.    DESCRIPTION OF PROCEDURE:    The patient was brought to the operating room and placed from operative table in the supine position.  Monitored anesthesia was administered.  A hip bump placed under the right hip.  An ankle tourniquet was placed about the right ankle.  Right foot was scrubbed, prepped, and draped in usual aseptic manner.  A timeout was performed identifying the correct surgical patient, procedure, side.  Attention was directed to the right foot where it was elevated, exsanguinated and the pneumatic ankle tourniquet was inflated to 250 mmHg.  Attention was directed to the dorsal medial aspect of  the right foot where a 8 cm curvilinear incision was made medial to the extensor hallucis longus tendon following the contour of the deformity.  Incision was deepened down to subcutaneous tissue using sharp and blunt dissection.  All bleeders were cauterized as necessary.  Care was taken to identify and retract all neurovascular structures.  Dissection was carried down to the capsule where an inverted L-type capsulotomy was made over the first metatarsal phalangeal joint.  The periosteum and capsule was reflected exposing the head of the first metatarsal.  There is noted be significantly hypertrophic medial eminence.  A power bone saw was used to resect the bony prominence and this was passed onto the back table for later use.  Bone saw was also used to resect a portion of the dorsal metatarsal head prominence and dorsal medial prominence.  Via the original skin incision dissection was then carried down into the first interspace where blunt dissection was taken down to the level of the abductor hallucis muscle.  Utilizing sharp dissection it was then freed from its attachment to the base of the proximal phalanx and the fibular sesamoid.  This was noted to help reduce the lateral contracture of the hallux.  Dissection was then directed to the base of the first metatarsal where a linear periosteal incision was made reflecting the periosteum dorsally and proximally off the base and proximal shaft of the first metatarsal.  Under fluoroscopic guidance a K wire was driven in a dorsal to plantar direction perpendicular to the long axis of the shaft.  Once the wire was placed in appropriate position it was utilized as a cutting guide as a power bone saw was then used to create a partial osteotomy through the base of the proximal phalanx.  This osteotomy was created approximately 1 1/2 cm distal to the first metatarsocuneiform joint.  The lateral cortex was left intact.  The osteotomy was opened with distraction and an  osteotome.  The paragon trial sizer was used under fluoroscopy and it seemed appropriate that a 4 mm opening wedge plate would adequately reduce the deformity.  The osteotomy was held open and a 4 mm opening base wedge plate was impacted across the osteotomy site.  It was temporally fixated with a plate tack.  The plate was then fixated across the osteotomy site with multiple locking and nonlocking 3.5 and 2.7 mm screws.  The previously removed medial eminence had all its soft tissue and cartilaginous structures were removed with a bone Rongeur and broken into pieces of raw bone and this was impacted and placed into the osteotomy site for additional bone grafting and fixation.  This was noted to reduce the intermetatarsal angle as well as partially reduce the hallux valgus deformity.  On fluoroscopy there was still lateral deviation of the hallux noted that seemed to be a deformity more related to the proximal phalanx so at that time and I deemed it necessary to perform a distal osteotomy as well.  The wound was irrigated with saline.  The periosteum and capsule was closed over the first metatarsal base and shaft with 2-0 Vicryl in a simple interrupted suture fashion.  Attention was then directed to the medial aspect of the proximal phalanx where the periosteum was reflected sharply dorsally and plantarly.  The power bone saw was used to create a medially based wedge osteotomy.  Lateral cortex was left intact.  A 2-3 mm medially based wedge of bone was resected from the proximal phalanx base and passed from operative field.  With the osteotomy site compressed a K wire was placed across the osteotomy site in a dorsal proximal to distal plantar direction to temporarily hold fixation.  A paragon 2.0 mm 2 hole plate was temporally fixated across the osteotomy site with a plate tack.  The plate was then secured with 2 2.0 mm locking screws.  Fluoroscopy shots again were taken and showed good alignment of the first MTPJ  with adequate reduction of all bony deformities.  The wound was further irrigated with saline.  The medial capsule was repaired and imbricated with 2-0 Vicryl in a box suture fashion.  The distal portion of the capsule was repaired and imbricated with 2-0 Vicryl in a simple interrupted suture fashion.  Subcutaneous tissue was closed with 3-0 Vicryl in a simple suture fashion.  An was closed with 3-0 nylon in horizontal mattress suture fashion.  After the procedure sterile compressive dressing consisting of Adaptic, 4 x 4's, Kerlix, Ace wrap were applied to the right foot.  The pneumatic ankle tourniquet was deflated and a prompt hyperemic response noted to all digits of the right foot.  Patient tolerated the procedure and anesthesia well.  She was transferred from operating room to the recovery room with vital signs stable and neurovascular status intact right foot.

## 2017-12-04 NOTE — PLAN OF CARE
Problem: Perioperative Period (Adult)  Intervention: Promote Pulmonary Hygiene and Secretion Clearance    12/04/17 1047   Promote Aggressive Pulmonary Hygiene/Secretion Management   Cough And Deep Breathing other (see comments)  (does not attempt.)       Intervention: Monitor/Manage Pain    12/04/17 1047   Safety Interventions   Medication Review/Management medications reviewed   Manage Acute Burn Pain   Pain Management Interventions pillow support       Intervention: Monitor/Manage Postoperative Bleeding    12/04/17 1047   Safety Interventions   Bleeding Management dressing monitored       Intervention: Promote Normothermia    12/04/17 1047   Cardiac Interventions   Warming Thermoregulation Maintenance skin exposure time minimized;warm blankets applied

## 2017-12-04 NOTE — PLAN OF CARE
Problem: Perioperative Period (Adult)  Goal: Signs and Symptoms of Listed Potential Problems Will be Absent or Manageable (Perioperative Period)  Outcome: Ongoing (interventions implemented as appropriate)    12/04/17 0736   Perioperative Period   Problems Assessed (Perioperative Period) all   Problems Present (Perioperative Period) none

## 2017-12-04 NOTE — ANESTHESIA POSTPROCEDURE EVALUATION
Patient: Shruthi Boone    Procedure Summary     Date Anesthesia Start Anesthesia Stop Room / Location    12/04/17 0903   [facility]       Procedure Diagnosis Surgeon Provider    Right foot bunionectomy with osteotomy, double osteotomy with Akin osteotomy (Right Toes) Hallux valgus of right foot  (Hallux valgus of right foot [M20.11]) Reno Mccullough, RANDY Yu CRNA          Anesthesia Type: MAC  Last vitals  BP   130/81 (12/04/17 0858)   Temp   97.5 °F (36.4 °C) (12/04/17 0753)   Pulse   73 (12/04/17 0847)   Resp   13 (12/04/17 0847)     SpO2   99 % (12/04/17 0858)     Post Anesthesia Care and Evaluation    Patient location during evaluation: PACU  Patient participation: complete - patient participated  Level of consciousness: awake  Pain score: 0  Pain management: adequate  Airway patency: patent  Anesthetic complications: No anesthetic complications  PONV Status: none  Cardiovascular status: acceptable  Respiratory status: acceptable and nasal cannula  Hydration status: acceptable    Comments: vsss resp spont, reflexes intact, responsive, report given to pacu nurse

## 2017-12-04 NOTE — ANESTHESIA PROCEDURE NOTES
Peripheral Block    Patient location during procedure: pre-op  Start time: 12/4/2017 8:18 AM  Stop time: 12/4/2017 8:28 AM  Reason for block: procedure for pain, at surgeon's request and post-op pain management  Performed by  CRNA: CHANDAN LYNCH  Preanesthetic Checklist  Completed: patient identified, site marked, surgical consent, pre-op evaluation, timeout performed, IV checked, risks and benefits discussed and monitors and equipment checked  Prep:  Pt Position: supine  Sterile barriers:mask, gloves and cap  Prep: ChloraPrep  Patient monitoring: blood pressure monitoring, continuous pulse oximetry and EKG  Procedure  Sedation:yes    Guidance:ultrasound guided and nerve stimulator  Images:still images obtained  Loss of twitch: 0.5 mA  Laterality:right  Block Type:popliteal  Injection Technique:single-shot  Needle Type:echogenic and short-bevel  Needle Gauge:21 G  Resistance on Injection: none  Medications  Comment:30ml 0.5% Bupivacaine   5mg Decadron adjunct  Local Injected:bupivacaine 0.5% Local Amount Injected:30mL  Post Assessment  Injection Assessment: negative aspiration for heme, no paresthesia on injection and incremental injection  Patient Tolerance:comfortable throughout block  Complications:yes  Additional Notes   Incremental injection with negative aspirate. No pain on injection. Normal injection resistance.  Vascular puncture avoided. Nerves and surrounding tissues identified with local spread around nerves visualized.

## 2017-12-04 NOTE — PLAN OF CARE
Problem: Patient Care Overview (Adult)  Goal: Plan of Care Review  Outcome: Ongoing (interventions implemented as appropriate)    12/04/17 1141   Outcome Evaluation   Outcome Summary/Follow up Plan PACU DISCHARGE CRITERIA MET.

## 2017-12-20 DIAGNOSIS — Z09 FOLLOW UP: Primary | ICD-10-CM

## 2017-12-21 ENCOUNTER — OFFICE VISIT (OUTPATIENT)
Dept: ORTHOPEDIC SURGERY | Facility: CLINIC | Age: 47
End: 2017-12-21

## 2017-12-21 VITALS — RESPIRATION RATE: 18 BRPM | BODY MASS INDEX: 26.53 KG/M2 | HEIGHT: 67 IN | WEIGHT: 169 LBS

## 2017-12-21 DIAGNOSIS — M77.51 BURSITIS OF RIGHT FOOT: ICD-10-CM

## 2017-12-21 DIAGNOSIS — M20.11 HALLUX VALGUS OF RIGHT FOOT: Primary | ICD-10-CM

## 2017-12-21 PROCEDURE — 99024 POSTOP FOLLOW-UP VISIT: CPT | Performed by: PODIATRIST

## 2017-12-21 RX ORDER — OXYCODONE AND ACETAMINOPHEN 7.5; 325 MG/1; MG/1
1 TABLET ORAL EVERY 6 HOURS PRN
Qty: 30 TABLET | Refills: 0 | Status: SHIPPED | OUTPATIENT
Start: 2017-12-21 | End: 2018-01-23

## 2017-12-21 NOTE — PROGRESS NOTES
Subjective   Patient ID: Shruthi Boone is a 47 y.o. female STATUS POST:  Post-op of the Right Foot ( Right foot bunionectomy with opening base wedge osteotomy and Akin osteotomy 12/4/17)  She comes in today for her first postoperative follow-up visit.  She complains of her boot causing significant pain.  She tells me she was out of her pain medication in 6 days due to the boot and the plastic top of the boot bothering her.  She tells me she has been changing her dressing.  She did not have her original postoperative surgical dressing on today.  According to nursing staff the dressing that she had on will slightly damp and wet.  She has crutches with her and tells me that she's been weightbearing in the boot without the front cover on it.  Denies constitutional symptoms.    History of Present Illness    Review of Systems   Constitutional: Negative for diaphoresis, fever and unexpected weight change.   HENT: Negative for dental problem and sore throat.    Eyes: Negative for visual disturbance.   Respiratory: Negative for shortness of breath.    Cardiovascular: Negative for chest pain.   Gastrointestinal: Negative for abdominal pain, constipation, diarrhea, nausea and vomiting.   Genitourinary: Negative for difficulty urinating and frequency.   Neurological: Negative for headaches.   Hematological: Does not bruise/bleed easily.   All other systems reviewed and are negative.      Past Medical History:   Diagnosis Date   • Anxiety    • Arthritis     LEFT SHOULDER WITH BONE SPURS   • Asthma     AS A CHILD   • Bunion of right foot    • Depression    • Diabetes mellitus     PREDIABETIC   • GERD (gastroesophageal reflux disease)    • Hyperlipemia    • Lower back injury    • Lumbar herniated disc    • Migraines    • No natural teeth    • Sleep apnea     NO BIPAP OR CPAP   • Spine pain    • Tattoo     X4   • Wears glasses         Past Surgical History:   Procedure Laterality Date   • MULTIPLE TOOTH EXTRACTIONS     • TUBAL  ABDOMINAL LIGATION         Social History     Social History   • Marital status:      Spouse name: N/A   • Number of children: N/A   • Years of education: N/A     Occupational History   • Not on file.     Social History Main Topics   • Smoking status: Current Every Day Smoker     Packs/day: 0.50     Years: 34.00     Types: Cigarettes   • Smokeless tobacco: Never Used   • Alcohol use Yes      Comment: 7 CASES BEER PER DAY FOR 10 YEARS, NONE NOW   • Drug use: Yes     Special: Marijuana      Comment: 2 OZ PER WEEK NONE NOW   • Sexual activity: Defer     Other Topics Concern   • Not on file     Social History Narrative       Ready to quit: Not Answered  Counseling given: Not Answered      I have reviewed all of the above social hx, family hx, surgical hx, medications, allergies & ROS and confirm that it is accurate.    Allergies   Allergen Reactions   • Methadone GI Intolerance   • Aspirin Rash       Objective   Physical Exam   Constitutional: She is oriented to person, place, and time. She appears well-developed and well-nourished.   HENT:   Head: Normocephalic and atraumatic.   Eyes: EOM are normal. Pupils are equal, round, and reactive to light.   Neck: Normal range of motion.   Pulmonary/Chest: Effort normal.   Abdominal: Soft.   Musculoskeletal: Normal range of motion.   Neurological: She is alert and oriented to person, place, and time. She has normal reflexes.   Skin: Skin is warm.   Psychiatric: She has a normal mood and affect. Her behavior is normal. Judgment and thought content normal.   Nursing note reviewed.    Ortho Exam  Ortho Exam right foot exam:  Pulses are palpable, pedal hair is noted, sensations intact, there is edema present about the surgical site.  Incisions are clean dry and intact, well coapted, no sign of dehiscence  Sutures are intact and no sign of infection is present  She has about 30° of dorsiflexion of the first MTPJ and 20° of plantarflexion.  Interphalangeal joint range of  motion is unaffected and normal.  The erythema that is present is blanchable.  No sign of infection.  No abnormal warmth.    Assessment/Plan  status post 2 weeks from right foot opening base wedge osteotomy and Akin osteotomy  Independent Review of Radiographic Studies:      Laboratory and Other Studies:     Medical Decision Making:        Procedures  [] No procedures were performed in office today.    Shruthi was seen today for post-op.    Diagnoses and all orders for this visit:    Hallux valgus of right foot    Bursitis of right foot        Recommendations/Plan:  The patient's sutures were removed today and Steri-Strips and Mastisol were placed.  They can begin getting the foot wet in the shower now.  We will continue to weight-bear as tolerated in the Darco shoe/cam boot.  They should continue Rice as needed.  Elevation as needed.  Compression is needed.  sHe'll follow-up in 2 weeks for x-rays, She'll call with any signs or complaints of infection.  I showed her range of motion exercises of the first MTPJ.  I discussed with her her weightbearing options and explained she either had to go in a cast or Darco wedge shoe or wear the boot.  She states that she can tolerate the boot without the front cover so I explained that should be sufficient.  She still needs to protect her weightbearing.  Crutches as needed.    Return in about 2 weeks (around 1/4/2018), or xoa.  Patient agreeable to call or return sooner for any concerns.

## 2018-01-08 DIAGNOSIS — Z09 FOLLOW UP: Primary | ICD-10-CM

## 2018-01-09 ENCOUNTER — OFFICE VISIT (OUTPATIENT)
Dept: ORTHOPEDIC SURGERY | Facility: CLINIC | Age: 48
End: 2018-01-09

## 2018-01-09 VITALS — RESPIRATION RATE: 16 BRPM | HEIGHT: 67 IN | WEIGHT: 169 LBS | BODY MASS INDEX: 26.53 KG/M2

## 2018-01-09 DIAGNOSIS — M20.11 HALLUX VALGUS OF RIGHT FOOT: Primary | ICD-10-CM

## 2018-01-09 DIAGNOSIS — Z09 FOLLOW UP: ICD-10-CM

## 2018-01-09 PROCEDURE — 99024 POSTOP FOLLOW-UP VISIT: CPT | Performed by: PODIATRIST

## 2018-01-09 RX ORDER — INDOMETHACIN 50 MG/1
50 CAPSULE ORAL 2 TIMES DAILY WITH MEALS
Qty: 20 CAPSULE | Refills: 0 | Status: SHIPPED | OUTPATIENT
Start: 2018-01-09 | End: 2018-09-06 | Stop reason: SDUPTHER

## 2018-01-09 RX ORDER — METHYLPREDNISOLONE 4 MG/1
TABLET ORAL
Qty: 21 TABLET | Refills: 0 | Status: SHIPPED | OUTPATIENT
Start: 2018-01-09 | End: 2018-01-23

## 2018-01-09 NOTE — PROGRESS NOTES
Subjective   Patient ID: Shruthi Boone is a 47 y.o. female STATUS POST:  Post-op of the Right Foot (12/04/17: Right foot bunionectomy with osteotomy, double osteotomy with Akin osteotomy )  She presents today in her boot.  Complaining of increased pain.  Tells me that her foot has hurt significantly the last couple of days and yesterday she could barely walk on it.  She does have her boot today.  Complains of 2 scabbed areas bleeding.  Complains of 2 separate areas of pain.  Denies constitutional symptoms.    History of Present Illness    Review of Systems   Constitutional: Negative for diaphoresis, fever and unexpected weight change.   HENT: Negative for dental problem and sore throat.    Eyes: Negative for visual disturbance.   Respiratory: Negative for shortness of breath.    Cardiovascular: Negative for chest pain.   Gastrointestinal: Negative for abdominal pain, constipation, diarrhea, nausea and vomiting.   Genitourinary: Negative for difficulty urinating and frequency.   Musculoskeletal: Positive for arthralgias.   Neurological: Negative for headaches.   Hematological: Does not bruise/bleed easily.   All other systems reviewed and are negative.      Past Medical History:   Diagnosis Date   • Anxiety    • Arthritis     LEFT SHOULDER WITH BONE SPURS   • Asthma     AS A CHILD   • Bunion of right foot    • Depression    • Diabetes mellitus     PREDIABETIC   • GERD (gastroesophageal reflux disease)    • Hyperlipemia    • Lower back injury    • Lumbar herniated disc    • Migraines    • No natural teeth    • Sleep apnea     NO BIPAP OR CPAP   • Spine pain    • Tattoo     X4   • Wears glasses         Past Surgical History:   Procedure Laterality Date   • MULTIPLE TOOTH EXTRACTIONS     • TUBAL ABDOMINAL LIGATION         Social History     Social History   • Marital status:      Spouse name: N/A   • Number of children: N/A   • Years of education: N/A     Occupational History   • Not on file.     Social History  Main Topics   • Smoking status: Current Every Day Smoker     Packs/day: 0.50     Years: 34.00     Types: Cigarettes   • Smokeless tobacco: Never Used   • Alcohol use Yes      Comment: 7 CASES BEER PER DAY FOR 10 YEARS, NONE NOW   • Drug use: Yes     Special: Marijuana      Comment: 2 OZ PER WEEK NONE NOW   • Sexual activity: Defer     Other Topics Concern   • Not on file     Social History Narrative       Ready to quit: Not Answered  Counseling given: Not Answered      I have reviewed all of the above social hx, family hx, surgical hx, medications, allergies & ROS and confirm that it is accurate.    Allergies   Allergen Reactions   • Methadone GI Intolerance   • Aspirin Rash       Objective   Physical Exam   Constitutional: She is oriented to person, place, and time. She appears well-developed and well-nourished.   HENT:   Head: Normocephalic and atraumatic.   Eyes: EOM are normal. Pupils are equal, round, and reactive to light.   Neck: Normal range of motion.   Pulmonary/Chest: Effort normal.   Abdominal: Soft.   Musculoskeletal: Normal range of motion.   Neurological: She is alert and oriented to person, place, and time. She has normal reflexes.   Skin: Skin is warm.   Psychiatric: She has a normal mood and affect. Her behavior is normal. Judgment and thought content normal.   Nursing note reviewed.    Ortho Exam  Ortho Exam lower extremity exam:  Incisions are clean dry and intact, well coapted, no sign of dehiscence  She has 2 separate open bleeding areas that does seem to be from scab being removed.  Her incision still healed well.  Other some slight purplish discoloration along the incision.  Her plate is slightly palpable about the metatarsal base.  She complains of some slight tenderness to palpation over the plate as well as on the dorsal first MTPJ.  First MTPJ shows 40° of dorsiflexion and 20° of plantarflexion.  No significant pain with range of motion only palpation.  There is no crepitus with range of  motion of the hallux.  Interphalangeal joint range of motion is unremarkable.  No Drainage or warmth to the foot.  No significant edema.  no sign of infection is present      Assessment/Plan  status post 4 weeks from right foot bunionectomy with opening base wedge osteotomy and Akin osteotomy  Independent Review of Radiographic Studies:      Laboratory and Other Studies:     Medical Decision Making:        Procedures  [] No procedures were performed in office today.    Shruthi was seen today for post-op.    Diagnoses and all orders for this visit:    Hallux valgus of right foot    Follow up    Other orders  -     indomethacin (INDOCIN) 50 MG capsule; Take 1 capsule by mouth 2 (Two) Times a Day With Meals.  -     MethylPREDNISolone (MEDROL, JOSSELINE,) 4 MG tablet; Use as directed by package instructions        Recommendations/Plan:  I recommend she return home and ice and elevate the foot.  I will ensure she gets a compression sock and recommend she wear this at all times to help with compression.  I'll prescribe her a Medrol Dosepak and indomethacin to help with pain and inflammation.  I recommend she continue working on range of motion exercises of the great toe.  Continue weightbearing in the boot for 2 more weeks.  Follow-up in 2 weeks for follow-up x-rays and evaluation.  I'm hopeful that this irritation and pain decreases and that we can begin continuing transition to regular shoe gear.  Call with any questions or concerns.    Return in about 2 weeks (around 1/23/2018), or xoa.  Patient agreeable to call or return sooner for any concerns.

## 2018-01-22 DIAGNOSIS — Z09 FOLLOW UP: Primary | ICD-10-CM

## 2018-01-23 ENCOUNTER — OFFICE VISIT (OUTPATIENT)
Dept: ORTHOPEDIC SURGERY | Facility: CLINIC | Age: 48
End: 2018-01-23

## 2018-01-23 VITALS — WEIGHT: 166 LBS | HEIGHT: 67 IN | BODY MASS INDEX: 26.06 KG/M2 | RESPIRATION RATE: 16 BRPM

## 2018-01-23 DIAGNOSIS — M20.11 HALLUX VALGUS OF RIGHT FOOT: Primary | ICD-10-CM

## 2018-01-23 DIAGNOSIS — M79.671 RIGHT FOOT PAIN: ICD-10-CM

## 2018-01-23 PROCEDURE — 99024 POSTOP FOLLOW-UP VISIT: CPT | Performed by: PODIATRIST

## 2018-01-23 NOTE — PROGRESS NOTES
Subjective   Patient ID: Shruthi Boone is a 47 y.o. female   Follow-up of the Right Foot and Post-op (12/4/17 Right foot bunionectomy with opening base wedge osteotomy and Akin osteotomy, )  She presents today right at 6 weeks status post right foot bunionectomy with opening base wedge osteotomy and Akin osteotomy.  She comes in today wearing a regular shoe.  She tells me she's been in a regular shoe for the last few days because her foot does not hurt or bother her.    History of Present Illness                                                   Review of Systems   Constitutional: Negative for fever.   Respiratory: Negative for shortness of breath.    Cardiovascular: Negative for chest pain and leg swelling.   Gastrointestinal: Negative for abdominal pain and nausea.   Skin: Negative for wound.       Past Medical History:   Diagnosis Date   • Anxiety    • Arthritis     LEFT SHOULDER WITH BONE SPURS   • Asthma     AS A CHILD   • Bunion of right foot    • Depression    • Diabetes mellitus     PREDIABETIC   • GERD (gastroesophageal reflux disease)    • Hyperlipemia    • Lower back injury    • Lumbar herniated disc    • Migraines    • No natural teeth    • Sleep apnea     NO BIPAP OR CPAP   • Spine pain    • Tattoo     X4   • Wears glasses         Past Surgical History:   Procedure Laterality Date   • BUNIONECTOMY Right 12/4/2017    Procedure: Right foot bunionectomy with osteotomy, double osteotomy with Rui osteotomy;  Surgeon: Reno Mccullough DPM;  Location: [facility];  Service:    • MULTIPLE TOOTH EXTRACTIONS     • TUBAL ABDOMINAL LIGATION         Allergies   Allergen Reactions   • Methadone GI Intolerance   • Aspirin Rash       Family History   Problem Relation Age of Onset   • No Known Problems Mother    • Heart disease Father    • Hyperlipidemia Father    • Liver disease Father    • Thyroid disease Father    • Hypertension Father    • Kidney disease Father    • Stroke Father    • Heart attack Father    •  Arthritis Father    • Diabetes Sister    • Diabetes Maternal Grandmother    • Cancer Maternal Grandmother      throat & lung       Social History     Social History   • Marital status:      Spouse name: N/A   • Number of children: N/A   • Years of education: N/A     Occupational History   • Not on file.     Social History Main Topics   • Smoking status: Current Every Day Smoker     Packs/day: 0.50     Years: 34.00     Types: Cigarettes   • Smokeless tobacco: Never Used   • Alcohol use Yes      Comment: 7 CASES BEER PER DAY FOR 10 YEARS, NONE NOW   • Drug use: Yes     Special: Marijuana      Comment: 2 OZ PER WEEK NONE NOW   • Sexual activity: Defer     Other Topics Concern   • Not on file     Social History Narrative       Ready to quit: Not Answered  Counseling given: Not Answered       I have reviewed all of the above social hx, family hx, surgical hx, medications, allergies & ROS and confirm that it is accurate.  Objective   Physical Exam   Constitutional: She is oriented to person, place, and time. She appears well-developed and well-nourished.   HENT:   Head: Normocephalic and atraumatic.   Eyes: EOM are normal. Pupils are equal, round, and reactive to light.   Neck: Normal range of motion.   Pulmonary/Chest: Effort normal.   Abdominal: Soft.   Musculoskeletal: Normal range of motion.   Neurological: She is alert and oriented to person, place, and time. She has normal reflexes.   Skin: Skin is warm.   Psychiatric: She has a normal mood and affect. Her behavior is normal. Judgment and thought content normal.   Nursing note reviewed.    Ortho Exam  Ortho Exam right  Lower extremity exam:  Vascular: Pulses are palpable, pedal hair is noted, slight edema is noted to the medial foot and first ray  Neuro: Sensation intact  Derm: Surgical incisions healed well.  It's slightly thickened and there are or to 5 points along this surgical incision line that are white scabbed crusted areas that appear to be Vicryl  suture attempting to spit out.  She complains of no pain about the surgical site.  The hallux sits rectus.  No erythema.  Mild edema noted.  No acute clinical sign of infection.  No wound dehiscence.  MSK: She has 35° of dorsiflexion of the first MTPJ and 25° of plantarflexion.  There is no crepitus with range of motion.  No pain with range of motion.  No pain about the surgical site.  Hallux sits rectus.    Assessment/Plan 6 weeks status post right foot bunionectomy with opening base wedge osteotomy and Akin osteotomy  Independent Review of Radiographic Studies:      Laboratory and Other Studies:     Medical Decision Making:        Procedures  [] No procedures were performed in office today.    There are no diagnoses linked to this encounter.      Recommendations/Plan:  I advised her that she slightly pushing her activity levels and ideally would've waited attached longer to begin weightbearing in a regular shoe but the fact that she has no pain is a good sign.  I reviewed her x-rays with her.  Recommend she is okay to weight-bear in a regular shoe but recommend against any type of ballistic exercises or running or jumping.  I recommend she continue to elevate and ice to help with the swelling.  I will prescribe her compounded cream's for the scarring as well as inflammation to utilize daily.  I still recommend she continue her range of motion exercises.  Call me with any questions or concerns.  Otherwise follow-up in 4 weeks for x-rays.    Return in about 4 weeks (around 2/20/2018), or xoa.  Patient agreeable to call or return sooner for any concerns.

## 2018-02-19 DIAGNOSIS — Z09 FOLLOW UP: Primary | ICD-10-CM

## 2018-02-20 ENCOUNTER — OFFICE VISIT (OUTPATIENT)
Dept: ORTHOPEDIC SURGERY | Facility: CLINIC | Age: 48
End: 2018-02-20

## 2018-02-20 VITALS — HEIGHT: 67 IN | WEIGHT: 166 LBS | BODY MASS INDEX: 26.06 KG/M2 | RESPIRATION RATE: 18 BRPM

## 2018-02-20 DIAGNOSIS — M20.11 HALLUX VALGUS OF RIGHT FOOT: Primary | ICD-10-CM

## 2018-02-20 PROCEDURE — 99024 POSTOP FOLLOW-UP VISIT: CPT | Performed by: PODIATRIST

## 2018-02-20 NOTE — PROGRESS NOTES
Subjective   Patient ID: Shruthi Boone is a 47 y.o. female STATUS POST:  Post-op of the Right Foot (Right foot bunionectomy with opening base wedge osteotomy and Akin osteotomy 12/4/18)  She presents today for evaluation on her right foot.  States she has no pain or no complaints.  She says she feels good.    History of Present Illness    Review of Systems   Constitutional: Negative for diaphoresis, fever and unexpected weight change.   HENT: Negative for dental problem and sore throat.    Eyes: Negative for visual disturbance.   Respiratory: Negative for shortness of breath.    Cardiovascular: Negative for chest pain.   Gastrointestinal: Negative for abdominal pain, constipation, diarrhea, nausea and vomiting.   Genitourinary: Negative for difficulty urinating and frequency.   Neurological: Negative for headaches.   Hematological: Does not bruise/bleed easily.   All other systems reviewed and are negative.      Past Medical History:   Diagnosis Date   • Anxiety    • Arthritis     LEFT SHOULDER WITH BONE SPURS   • Asthma     AS A CHILD   • Bunion of right foot    • Depression    • Diabetes mellitus     PREDIABETIC   • GERD (gastroesophageal reflux disease)    • Hyperlipemia    • Lower back injury    • Lumbar herniated disc    • Migraines    • No natural teeth    • Sleep apnea     NO BIPAP OR CPAP   • Spine pain    • Tattoo     X4   • Wears glasses         Past Surgical History:   Procedure Laterality Date   • BUNIONECTOMY Right 12/4/2017    Procedure: Right foot bunionectomy with osteotomy, double osteotomy with Rui osteotomy;  Surgeon: Reno Mccullough DPM;  Location: [facility];  Service:    • MULTIPLE TOOTH EXTRACTIONS     • TUBAL ABDOMINAL LIGATION         Social History     Social History   • Marital status:      Spouse name: N/A   • Number of children: N/A   • Years of education: N/A     Occupational History   • Not on file.     Social History Main Topics   • Smoking status: Current Every Day Smoker      Packs/day: 0.50     Years: 34.00     Types: Cigarettes   • Smokeless tobacco: Never Used   • Alcohol use Yes      Comment: 7 CASES BEER PER DAY FOR 10 YEARS, NONE NOW   • Drug use: Yes     Special: Marijuana      Comment: 2 OZ PER WEEK NONE NOW   • Sexual activity: Defer     Other Topics Concern   • Not on file     Social History Narrative       Ready to quit: Not Answered  Counseling given: Not Answered      I have reviewed all of the above social hx, family hx, surgical hx, medications, allergies & ROS and confirm that it is accurate.    Allergies   Allergen Reactions   • Methadone GI Intolerance   • Aspirin Rash       Objective   Physical Exam   Constitutional: She is oriented to person, place, and time. She appears well-developed and well-nourished.   HENT:   Head: Normocephalic and atraumatic.   Eyes: EOM are normal. Pupils are equal, round, and reactive to light.   Neck: Normal range of motion.   Pulmonary/Chest: Effort normal.   Musculoskeletal: Normal range of motion.   Neurological: She is alert and oriented to person, place, and time. She has normal reflexes.   Skin: Skin is warm.   Psychiatric: She has a normal mood and affect. Her behavior is normal. Judgment and thought content normal.   Nursing note and vitals reviewed.    Ortho Exam  Ortho Exam  Right foot exam shows that she's neurovascularly intact.  No edema noted.  Pedal hair noted.  First MTPJ has 40° of dorsiflexion and 25° of plantarflexion.  There is slight residual lateral deviation but overall the hallux its rectus.  There is no pain with range of motion.  No edema.  No tenderness to palpation.  Her incision is slightly thickened and hypertrophic but no signs of keloid.    Assessment/Plan  status post 2-1/2 months status post right foot bunionectomy with opening base wedge osteotomy and Akin osteotomy  Independent Review of Radiographic Studies:      Laboratory and Other Studies:     Medical Decision Making:        Procedures  [] No  procedures were performed in office today.    There are no diagnoses linked to this encounter.      Recommendations/Plan:  She states she is doing great and has no complaints.  Wearing regular shoe gear to tolerance.  She says for now she thinks she is good and she will let me know when she would like to do surgery on the other foot.  For now we will see her back when necessary unless she develops any complaints or signs or symptoms.  I recommend she continue with scar cream or vitamin E or aloe to help thin out her incision.  Return if symptoms worsen or fail to improve.  Patient agreeable to call or return sooner for any concerns.

## 2018-07-10 ENCOUNTER — TELEPHONE (OUTPATIENT)
Dept: ORTHOPEDIC SURGERY | Facility: CLINIC | Age: 48
End: 2018-07-10

## 2018-09-06 ENCOUNTER — OFFICE VISIT (OUTPATIENT)
Dept: ORTHOPEDIC SURGERY | Facility: CLINIC | Age: 48
End: 2018-09-06

## 2018-09-06 VITALS — WEIGHT: 169 LBS | BODY MASS INDEX: 26.53 KG/M2 | RESPIRATION RATE: 18 BRPM | HEIGHT: 67 IN

## 2018-09-06 DIAGNOSIS — M79.671 FOOT PAIN, RIGHT: Primary | ICD-10-CM

## 2018-09-06 DIAGNOSIS — M77.51 BURSITIS OF RIGHT FOOT: ICD-10-CM

## 2018-09-06 DIAGNOSIS — M20.11 HALLUX VALGUS OF RIGHT FOOT: ICD-10-CM

## 2018-09-06 DIAGNOSIS — M79.671 RIGHT FOOT PAIN: ICD-10-CM

## 2018-09-06 PROCEDURE — 20600 DRAIN/INJ JOINT/BURSA W/O US: CPT | Performed by: PODIATRIST

## 2018-09-06 PROCEDURE — 99213 OFFICE O/P EST LOW 20 MIN: CPT | Performed by: PODIATRIST

## 2018-09-06 RX ORDER — BUPIVACAINE HYDROCHLORIDE 2.5 MG/ML
0.25 INJECTION, SOLUTION INFILTRATION; PERINEURAL
Status: COMPLETED | OUTPATIENT
Start: 2018-09-06 | End: 2018-09-06

## 2018-09-06 RX ORDER — BUSPIRONE HYDROCHLORIDE 5 MG/1
5 TABLET ORAL 3 TIMES DAILY
COMMUNITY
End: 2018-11-08

## 2018-09-06 RX ORDER — TRIAMCINOLONE ACETONIDE 40 MG/ML
20 INJECTION, SUSPENSION INTRA-ARTICULAR; INTRAMUSCULAR
Status: COMPLETED | OUTPATIENT
Start: 2018-09-06 | End: 2018-09-06

## 2018-09-06 RX ORDER — TRAZODONE HYDROCHLORIDE 50 MG/1
50 TABLET ORAL NIGHTLY
COMMUNITY
End: 2018-11-08 | Stop reason: SDUPTHER

## 2018-09-06 RX ORDER — LIDOCAINE HYDROCHLORIDE 10 MG/ML
0.25 INJECTION, SOLUTION INFILTRATION; PERINEURAL
Status: COMPLETED | OUTPATIENT
Start: 2018-09-06 | End: 2018-09-06

## 2018-09-06 RX ORDER — INDOMETHACIN 50 MG/1
50 CAPSULE ORAL 2 TIMES DAILY WITH MEALS
Qty: 20 CAPSULE | Refills: 0 | Status: SHIPPED | OUTPATIENT
Start: 2018-09-06 | End: 2018-11-08

## 2018-09-06 RX ADMIN — BUPIVACAINE HYDROCHLORIDE 0.25 ML: 2.5 INJECTION, SOLUTION INFILTRATION; PERINEURAL at 11:22

## 2018-09-06 RX ADMIN — TRIAMCINOLONE ACETONIDE 20 MG: 40 INJECTION, SUSPENSION INTRA-ARTICULAR; INTRAMUSCULAR at 11:22

## 2018-09-06 RX ADMIN — LIDOCAINE HYDROCHLORIDE 0.25 ML: 10 INJECTION, SOLUTION INFILTRATION; PERINEURAL at 11:22

## 2018-09-06 NOTE — PROGRESS NOTES
Subjective   Patient ID: Shruthi Boone is a 48 y.o. female   Follow-up and Pain of the Right Foot  Patient presents back today after not being seen in several months.  She was last seen February 2018 in which she had no complaints.  She states her right foot been bothering her now for the last couple months.  She states the more she is up on it the more it bothers her.  She says it hurts at night and she has to wear a sock on it to keep it from hurting.  She also states that the more she is up on it and even walking her dog hurts it.  sHe says parts of the toe still film him.  She complains of a small area in the middle portion of the incision as well.  History of Present Illness    Pain Score: 6  Pain Location: Foot  Pain Orientation: Right     Pain Descriptors: Aching, Burning, Throbbing  Pain Frequency: Constant/continuous  Pain Onset: Ongoing     Clinical Progression: Not changed  Aggravating Factors: Walking, Standing        Pain Intervention(s): Home medication, Rest  Result of Injury: No  Work-Related Injury: No    Review of Systems   Constitutional: Negative for diaphoresis, fever and unexpected weight change.   HENT: Negative for dental problem and sore throat.    Eyes: Negative for visual disturbance.   Respiratory: Negative for shortness of breath.    Cardiovascular: Negative for chest pain.   Gastrointestinal: Negative for abdominal pain, constipation, diarrhea, nausea and vomiting.   Genitourinary: Negative for difficulty urinating and frequency.   Neurological: Negative for headaches.   Hematological: Does not bruise/bleed easily.   All other systems reviewed and are negative.      Past Medical History:   Diagnosis Date   • Anxiety    • Arthritis     LEFT SHOULDER WITH BONE SPURS   • Asthma     AS A CHILD   • Bunion of right foot    • Depression    • Diabetes mellitus (CMS/[facility])     PREDIABETIC   • GERD (gastroesophageal reflux disease)    • Hyperlipemia    • Lower back injury    • Lumbar herniated  disc    • Migraines    • No natural teeth    • Sleep apnea     NO BIPAP OR CPAP   • Spine pain    • Tattoo     X4   • Wears glasses         Past Surgical History:   Procedure Laterality Date   • BUNIONECTOMY Right 12/4/2017    Procedure: Right foot bunionectomy with osteotomy, double osteotomy with Rui osteotomy;  Surgeon: Reno Mccullough DPM;  Location: [facility];  Service:    • MULTIPLE TOOTH EXTRACTIONS     • TUBAL ABDOMINAL LIGATION         Allergies   Allergen Reactions   • Methadone GI Intolerance   • Aspirin Rash         Current Outpatient Prescriptions:   •  albuterol (PROVENTIL HFA;VENTOLIN HFA) 108 (90 Base) MCG/ACT inhaler, Inhale 2 puffs Every 4 (Four) Hours As Needed for Wheezing., Disp: , Rfl:   •  benzonatate (TESSALON) 200 MG capsule, Take 200 mg by mouth 3 (Three) Times a Day As Needed for Cough., Disp: , Rfl:   •  busPIRone (BUSPAR) 5 MG tablet, Take 5 mg by mouth 3 (Three) Times a Day., Disp: , Rfl:   •  diclofenac (VOLTAREN) 1 % gel gel, Apply 4 g topically to the appropriate area as directed 4 (Four) Times a Day As Needed (pain)., Disp: 1 tube, Rfl: 1  •  gabapentin (NEURONTIN) 300 MG capsule, Take 1 capsule by mouth 3 (Three) Times a Day. (Patient taking differently: Take 600 mg by mouth 3 (Three) Times a Day.), Disp: 90 capsule, Rfl: 1  •  indomethacin (INDOCIN) 50 MG capsule, Take 1 capsule by mouth 2 (Two) Times a Day With Meals., Disp: 20 capsule, Rfl: 0  •  pramipexole (MIRAPEX) 0.5 MG tablet, Take 1 tablet by mouth Every Night. (Patient taking differently: Take 1 mg by mouth Every Night.), Disp: 30 tablet, Rfl: 2  •  sertraline (ZOLOFT) 100 MG tablet, Take 100 mg by mouth Daily., Disp: , Rfl:   •  SUMAtriptan (IMITREX) 100 MG tablet, Take 100 mg by mouth Every 2 (Two) Hours As Needed for Migraine., Disp: , Rfl:   •  traZODone (DESYREL) 50 MG tablet, Take 50 mg by mouth Every Night., Disp: , Rfl:   •  Diclofenac Sodium 1.5 % solution, APPLY A THIN LAYER  DIME SIZED AMOUNT  TO THE  AFFECTED AREA 3 4 TIMES DAILY, Disp: , Rfl: 2    Family History   Problem Relation Age of Onset   • No Known Problems Mother    • Heart disease Father    • Hyperlipidemia Father    • Liver disease Father    • Thyroid disease Father    • Hypertension Father    • Kidney disease Father    • Stroke Father    • Heart attack Father    • Arthritis Father    • Diabetes Sister    • Diabetes Maternal Grandmother    • Cancer Maternal Grandmother         throat & lung       Social History     Social History   • Marital status:      Spouse name: N/A   • Number of children: N/A   • Years of education: N/A     Occupational History   • Not on file.     Social History Main Topics   • Smoking status: Current Every Day Smoker     Packs/day: 0.50     Years: 34.00     Types: Cigarettes   • Smokeless tobacco: Never Used   • Alcohol use Yes      Comment: 7 CASES BEER PER DAY FOR 10 YEARS, NONE NOW   • Drug use: Yes     Types: Marijuana      Comment: 2 OZ PER WEEK NONE NOW   • Sexual activity: Defer     Other Topics Concern   • Not on file     Social History Narrative   • No narrative on file       Ready to quit: No  Counseling given: Yes       I have reviewed all of the above social hx, family hx, surgical hx, medications, allergies & ROS and confirm that it is accurate.  Objective   Physical Exam   Constitutional: She is oriented to person, place, and time. She appears well-developed and well-nourished.   HENT:   Head: Normocephalic and atraumatic.   Eyes: Pupils are equal, round, and reactive to light. EOM are normal.   Neck: Normal range of motion.   Pulmonary/Chest: Effort normal.   Musculoskeletal: Normal range of motion.   Neurological: She is alert and oriented to person, place, and time. She has normal reflexes.   Skin: Skin is warm.   Psychiatric: She has a normal mood and affect. Her behavior is normal. Judgment and thought content normal.   Nursing note and vitals reviewed.    Ortho Exam  Ortho Exam right  Lower  extremity exam:  Vascular: Pulses palpable, pedal hair noted, CFT brisk, no edema noted  Neuro: Gross sensation intact gross sensation seems unaltered.  No paresthesias.  Negative Tinel's over the surgical site.  Derm: Normal turgor and temperature, no wounds or sores or lesions.  Incision healed well.  No erythema or ecchymosis.  Medial to the central portion of the incision there is 2 small specks of hyperkeratosis that may be remnants of the underlying Vicryl suture.  No drainage or odor.  MSK: The right hallux remains rectus with only subtle lateral drift.  Her plate is palpable but she doesn't seem to be painful there.  She doesn't seem to be painful at the metatarsocuneiform joint.  Pain seems to be with range of motion of the first MTPJ.  Slight lateral contracture is reducible.  I don't appreciate any crepitus or grinding with range of motion.  She has about 35-40° of dorsiflexion and 20° of plantarflexion.  She complains with palpation and range of motion of the first MTPJ.    Assessment/Plan right foot first MTPJ pain, 9 months status post right foot opening base wedge bunionectomy and Akin osteotomy  Independent Review of Radiographic Studies:      Laboratory and Other Studies:     Medical Decision Making:        Small Joint Arthrocentesis  Consent given by: patient  Site marked: site marked  Timeout: Immediately prior to procedure a time out was called to verify the correct patient, procedure, equipment, support staff and site/side marked as required   Supporting Documentation  Indications: pain, joint swelling and diagnostic evaluation   Procedure Details  Location: great toe - R great MTP  Needle size: 25 G  Approach: dorsal  Medications administered: 0.25 mL lidocaine 1 %; 0.25 mL bupivacaine 0.25 %; 20 mg triamcinolone acetonide 40 MG/ML (Dexamethasone 10mg/ml 0.25ml NDC: 1934-2778-93 Lot: [id number] EXP: 10/31/2019)  Patient tolerance: patient tolerated the procedure well with no immediate  complications        [] No procedures were performed in office today.    Shruthi was seen today for follow-up and pain.    Diagnoses and all orders for this visit:    Foot pain, right  -     XR Foot 3+ View Right    Hallux valgus of right foot    Right foot pain    Bursitis of right foot    Other orders  -     diclofenac (VOLTAREN) 1 % gel gel; Apply 4 g topically to the appropriate area as directed 4 (Four) Times a Day As Needed (pain).  -     indomethacin (INDOCIN) 50 MG capsule; Take 1 capsule by mouth 2 (Two) Times a Day With Meals.  -     Small Joint Arthrocentesis          Recommendations/Plan:  Along with the injection I refilled her Voltaren gel and indomethacin that I would like for her to take for a couple of weeks.  I think part of this may be shoe gear related and I recommend better supportive shoes as well.  She will be sent downstairs for x-rays.  Follow-up here in 2-3 weeks.  May need to consider more advanced imaging or hardware removal.    Return in about 3 weeks (around 9/27/2018).  Patient agreeable to call or return sooner for any concerns.

## 2018-09-25 ENCOUNTER — OFFICE VISIT (OUTPATIENT)
Dept: ORTHOPEDIC SURGERY | Facility: CLINIC | Age: 48
End: 2018-09-25

## 2018-09-25 VITALS — RESPIRATION RATE: 16 BRPM | WEIGHT: 169 LBS | BODY MASS INDEX: 26.53 KG/M2 | HEIGHT: 67 IN

## 2018-09-25 DIAGNOSIS — M20.11 HALLUX VALGUS OF RIGHT FOOT: Primary | ICD-10-CM

## 2018-09-25 DIAGNOSIS — M77.51 BURSITIS OF RIGHT FOOT: ICD-10-CM

## 2018-09-25 PROCEDURE — 99213 OFFICE O/P EST LOW 20 MIN: CPT | Performed by: PODIATRIST

## 2018-09-25 NOTE — PROGRESS NOTES
Subjective   Patient ID: Shruthi Boone is a 48 y.o. female   Follow-up of the Right Foot (Hallux valgus, right foot bursitis)  Patient presents back today for her right foot evaluation.  She states the injection did not do anything for her.  She still states it feels like somebody has a rubber band wrapped around her great toe and it hurts all the time.  She states it also feels numb.  She is wearing normal shoe gear.    History of Present Illness                                                   Review of Systems   Constitutional: Negative for diaphoresis, fever and unexpected weight change.   HENT: Negative for dental problem and sore throat.    Eyes: Negative for visual disturbance.   Respiratory: Negative for shortness of breath.    Cardiovascular: Negative for chest pain.   Gastrointestinal: Negative for abdominal pain, constipation, diarrhea, nausea and vomiting.   Genitourinary: Negative for difficulty urinating and frequency.   Musculoskeletal: Positive for arthralgias.   Neurological: Negative for headaches.   Hematological: Does not bruise/bleed easily.   All other systems reviewed and are negative.      Past Medical History:   Diagnosis Date   • Anxiety    • Arthritis     LEFT SHOULDER WITH BONE SPURS   • Asthma     AS A CHILD   • Bunion of right foot    • Depression    • Diabetes mellitus (CMS/HCC)     PREDIABETIC   • GERD (gastroesophageal reflux disease)    • Hyperlipemia    • Lower back injury    • Lumbar herniated disc    • Migraines    • No natural teeth    • Sleep apnea     NO BIPAP OR CPAP   • Spine pain    • Tattoo     X4   • Wears glasses         Past Surgical History:   Procedure Laterality Date   • BUNIONECTOMY Right 12/4/2017    Procedure: Right foot bunionectomy with osteotomy, double osteotomy with Rui osteotomy;  Surgeon: Reno Mccullough DPM;  Location: [facility];  Service:    • MULTIPLE TOOTH EXTRACTIONS     • TUBAL ABDOMINAL LIGATION         Allergies   Allergen Reactions   •  Methadone GI Intolerance   • Aspirin Rash         Current Outpatient Prescriptions:   •  albuterol (PROVENTIL HFA;VENTOLIN HFA) 108 (90 Base) MCG/ACT inhaler, Inhale 2 puffs Every 4 (Four) Hours As Needed for Wheezing., Disp: , Rfl:   •  benzonatate (TESSALON) 200 MG capsule, Take 200 mg by mouth 3 (Three) Times a Day As Needed for Cough., Disp: , Rfl:   •  busPIRone (BUSPAR) 5 MG tablet, Take 5 mg by mouth 3 (Three) Times a Day., Disp: , Rfl:   •  diclofenac (VOLTAREN) 1 % gel gel, Apply 4 g topically to the appropriate area as directed 4 (Four) Times a Day As Needed (pain)., Disp: 1 tube, Rfl: 1  •  Diclofenac Sodium 1.5 % solution, APPLY A THIN LAYER  DIME SIZED AMOUNT  TO THE AFFECTED AREA 3 4 TIMES DAILY, Disp: , Rfl: 2  •  gabapentin (NEURONTIN) 300 MG capsule, Take 1 capsule by mouth 3 (Three) Times a Day. (Patient taking differently: Take 600 mg by mouth 3 (Three) Times a Day.), Disp: 90 capsule, Rfl: 1  •  indomethacin (INDOCIN) 50 MG capsule, Take 1 capsule by mouth 2 (Two) Times a Day With Meals., Disp: 20 capsule, Rfl: 0  •  pramipexole (MIRAPEX) 0.5 MG tablet, Take 1 tablet by mouth Every Night. (Patient taking differently: Take 1 mg by mouth Every Night.), Disp: 30 tablet, Rfl: 2  •  sertraline (ZOLOFT) 100 MG tablet, Take 100 mg by mouth Daily., Disp: , Rfl:   •  SUMAtriptan (IMITREX) 100 MG tablet, Take 100 mg by mouth Every 2 (Two) Hours As Needed for Migraine., Disp: , Rfl:   •  traZODone (DESYREL) 50 MG tablet, Take 50 mg by mouth Every Night., Disp: , Rfl:     Family History   Problem Relation Age of Onset   • No Known Problems Mother    • Heart disease Father    • Hyperlipidemia Father    • Liver disease Father    • Thyroid disease Father    • Hypertension Father    • Kidney disease Father    • Stroke Father    • Heart attack Father    • Arthritis Father    • Diabetes Sister    • Diabetes Maternal Grandmother    • Cancer Maternal Grandmother         throat & lung       Social History     Social  History   • Marital status:      Spouse name: N/A   • Number of children: N/A   • Years of education: N/A     Occupational History   • Not on file.     Social History Main Topics   • Smoking status: Current Every Day Smoker     Packs/day: 0.50     Years: 34.00     Types: Cigarettes   • Smokeless tobacco: Never Used   • Alcohol use Yes      Comment: 7 CASES BEER PER DAY FOR 10 YEARS, NONE NOW   • Drug use: Yes     Types: Marijuana      Comment: 2 OZ PER WEEK NONE NOW   • Sexual activity: Defer     Other Topics Concern   • Not on file     Social History Narrative   • No narrative on file       Ready to quit: No  Counseling given: Yes       I have reviewed all of the above social hx, family hx, surgical hx, medications, allergies & ROS and confirm that it is accurate.  Objective   Physical Exam   Constitutional: She is oriented to person, place, and time. She appears well-developed and well-nourished.   HENT:   Head: Normocephalic and atraumatic.   Eyes: Pupils are equal, round, and reactive to light. EOM are normal.   Neck: Normal range of motion.   Pulmonary/Chest: Effort normal.   Musculoskeletal: Normal range of motion.   Neurological: She is alert and oriented to person, place, and time. She has normal reflexes.   Skin: Skin is warm.   Psychiatric: She has a normal mood and affect. Her behavior is normal. Judgment and thought content normal.   Nursing note and vitals reviewed.    Ortho Exam  Ortho Exam right  Lower extremity exam:  Vascular: Pulses palpable, pedal hair noted, no edema noted, CFT brisk  Neuro: Gross sensation intact.  Negative Tinel's about the first ray.  She states the surgical site has decreased sensation  Derm: Surgical incisions healed well.  Normal turgor and temperature.  No erythema or edema.  MSK: She has no pain or discomfort at the proximal first metatarsal base and hardware.  She has fluid range of motion of the first MTPJ with no crepitus.  There is about 35° dorsiflexion and  20° plantarflexion.  She complains of some slight discomfort with range of motion but states that the pain seems to be more with pressure on the first MTPJ.  There is subtle lateral drift of the hallux.    Assessment/Plan right first metatarsophalangeal joint pain, potential painful hardware, possible nerve entrapment  Independent Review of Radiographic Studies:      Laboratory and Other Studies:     Medical Decision Making:        Procedures  [] No procedures were performed in office today.    Shruthi was seen today for follow-up.    Diagnoses and all orders for this visit:    Hallux valgus of right foot  -     CT lower extremity left wo contrast    Bursitis of right foot  -     CT lower extremity left wo contrast          Recommendations/Plan:  It still somewhat difficult for me to determine the exact etiology of her pain and whether or not this is related to the joint itself or the hardware versus some type of nerve pathology.  We will begin with CT scan of the right foot and determine if the hardware seems to be problematic or extremely close to the joint surface.  Ideally MRI would probably be a better option but with the hardware very close artifact would potentially make it nondiagnostic.  I did tell her we may consider MRI but I would like to start with a CT scan first to determine if that gives us any information.  I can call her with the CT scan results and I'll let her know what we need to do next when she has that done.    No Follow-up on file.  Patient agreeable to call or return sooner for any concerns.

## 2018-10-03 ENCOUNTER — HOSPITAL ENCOUNTER (OUTPATIENT)
Dept: CT IMAGING | Facility: HOSPITAL | Age: 48
Discharge: HOME | End: 2018-10-03
Attending: PODIATRIST | Admitting: PODIATRIST

## 2018-10-03 PROCEDURE — 73700 CT LOWER EXTREMITY W/O DYE: CPT

## 2018-11-08 ENCOUNTER — OFFICE VISIT (OUTPATIENT)
Dept: FAMILY MEDICINE CLINIC | Facility: CLINIC | Age: 48
End: 2018-11-08

## 2018-11-08 VITALS
SYSTOLIC BLOOD PRESSURE: 128 MMHG | HEART RATE: 88 BPM | TEMPERATURE: 97.2 F | DIASTOLIC BLOOD PRESSURE: 86 MMHG | RESPIRATION RATE: 18 BRPM | HEIGHT: 64 IN | WEIGHT: 166 LBS | BODY MASS INDEX: 28.34 KG/M2

## 2018-11-08 DIAGNOSIS — F41.9 ANXIETY: ICD-10-CM

## 2018-11-08 DIAGNOSIS — F17.200 CURRENT SMOKER: ICD-10-CM

## 2018-11-08 DIAGNOSIS — Z13.220 SCREENING FOR HYPERLIPIDEMIA: ICD-10-CM

## 2018-11-08 DIAGNOSIS — E66.3 OVERWEIGHT (BMI 25.0-29.9): ICD-10-CM

## 2018-11-08 DIAGNOSIS — G25.81 RESTLESS LEG SYNDROME: ICD-10-CM

## 2018-11-08 DIAGNOSIS — G89.29 CHRONIC LEFT-SIDED LOW BACK PAIN WITH LEFT-SIDED SCIATICA: ICD-10-CM

## 2018-11-08 DIAGNOSIS — G43.809 OTHER MIGRAINE WITHOUT STATUS MIGRAINOSUS, NOT INTRACTABLE: ICD-10-CM

## 2018-11-08 DIAGNOSIS — R73.03 PRE-DIABETES: ICD-10-CM

## 2018-11-08 DIAGNOSIS — M54.42 CHRONIC LEFT-SIDED LOW BACK PAIN WITH LEFT-SIDED SCIATICA: ICD-10-CM

## 2018-11-08 DIAGNOSIS — F33.2 SEVERE EPISODE OF RECURRENT MAJOR DEPRESSIVE DISORDER, WITHOUT PSYCHOTIC FEATURES (HCC): Primary | ICD-10-CM

## 2018-11-08 PROCEDURE — 99214 OFFICE O/P EST MOD 30 MIN: CPT | Performed by: FAMILY MEDICINE

## 2018-11-08 RX ORDER — ALBUTEROL SULFATE 90 UG/1
2 AEROSOL, METERED RESPIRATORY (INHALATION) 2 TIMES DAILY
COMMUNITY
End: 2018-11-08 | Stop reason: SDUPTHER

## 2018-11-08 RX ORDER — SERTRALINE HYDROCHLORIDE 100 MG/1
100 TABLET, FILM COATED ORAL DAILY
Qty: 30 TABLET | Refills: 3 | Status: SHIPPED | OUTPATIENT
Start: 2018-11-08 | End: 2019-06-10 | Stop reason: SDUPTHER

## 2018-11-08 RX ORDER — PRAMIPEXOLE DIHYDROCHLORIDE 1.5 MG/1
1.5 TABLET ORAL NIGHTLY
COMMUNITY
End: 2018-11-08 | Stop reason: SDUPTHER

## 2018-11-08 RX ORDER — BUSPIRONE HYDROCHLORIDE 7.5 MG/1
7.5 TABLET ORAL 3 TIMES DAILY
COMMUNITY
End: 2018-11-08 | Stop reason: SDUPTHER

## 2018-11-08 RX ORDER — ALBUTEROL SULFATE 90 UG/1
2 AEROSOL, METERED RESPIRATORY (INHALATION) EVERY 4 HOURS PRN
Qty: 18 G | Refills: 3 | Status: SHIPPED | OUTPATIENT
Start: 2018-11-08 | End: 2019-06-10 | Stop reason: SDUPTHER

## 2018-11-08 RX ORDER — CLOBETASOL PROPIONATE 0.5 MG/G
OINTMENT TOPICAL 2 TIMES DAILY
COMMUNITY
End: 2019-06-10

## 2018-11-08 RX ORDER — TRAZODONE HYDROCHLORIDE 100 MG/1
100 TABLET ORAL NIGHTLY
Qty: 30 TABLET | Refills: 3 | Status: SHIPPED | OUTPATIENT
Start: 2018-11-08 | End: 2019-06-10 | Stop reason: SDUPTHER

## 2018-11-08 RX ORDER — BUSPIRONE HYDROCHLORIDE 7.5 MG/1
7.5 TABLET ORAL 3 TIMES DAILY
Qty: 90 TABLET | Refills: 3 | Status: SHIPPED | OUTPATIENT
Start: 2018-11-08 | End: 2019-06-10 | Stop reason: SDUPTHER

## 2018-11-08 RX ORDER — PRAMIPEXOLE DIHYDROCHLORIDE 1.5 MG/1
1.5 TABLET ORAL NIGHTLY
Qty: 30 TABLET | Refills: 3 | Status: SHIPPED | OUTPATIENT
Start: 2018-11-08 | End: 2019-06-10 | Stop reason: ALTCHOICE

## 2018-11-08 NOTE — PROGRESS NOTES
Subjective   Shruthi Boone is a 48 y.o. female.   Chief Complaint   Patient presents with   • Establish Care     History of Present Illness   She is here for a follow up, she recently moved back to the area from [place].   Depression: Chronic condition, treated with Sertraline, trazodone.   Symptoms are stable.    Anxiety: Treated with Sertraline, Buspar.     Chronic back pain with sciatica: She is requesting to resume gabapentin.   States that her provider in [place] was a nurse practitioner and couldn't provide this medication to her.   Symptoms previously resolved with gabapentin.     Smokes 1 pack every 3 days. She has no interest in quitting.     Migraines: Chronic condition, controlled with Topamax.     She has seen Dr. Mccullough for treatment on bunions.     The following portions of the patient's history were reviewed and updated as appropriate: allergies, current medications, past family history, past medical history, past social history, past surgical history and problem list.    Review of Systems   Constitutional: Positive for fatigue. Negative for chills and fever.   HENT: Positive for rhinorrhea. Negative for congestion, hearing loss and tinnitus.    Eyes: Negative for pain and visual disturbance.   Respiratory: Negative for cough, chest tightness, shortness of breath and wheezing.    Cardiovascular: Positive for leg swelling (ankles). Negative for chest pain and palpitations.   Gastrointestinal: Positive for nausea and vomiting. Negative for abdominal pain and blood in stool.   Endocrine: Positive for polydipsia. Negative for cold intolerance and heat intolerance.        Hot flashes     Genitourinary: Negative for dysuria and hematuria.   Musculoskeletal: Positive for arthralgias, back pain and myalgias. Negative for gait problem.   Skin: Positive for rash.   Neurological: Positive for weakness, numbness and headache. Negative for confusion.   Hematological: Negative for adenopathy. Bruises/bleeds easily.    Psychiatric/Behavioral: Positive for sleep disturbance and depressed mood. Negative for agitation, self-injury and suicidal ideas. The patient is nervous/anxious.        Objective   Physical Exam   Constitutional: She is oriented to person, place, and time. She appears well-developed and well-nourished.   HENT:   Head: Normocephalic and atraumatic.   Right Ear: Hearing, tympanic membrane, external ear and ear canal normal.   Left Ear: Hearing, tympanic membrane, external ear and ear canal normal.   Nose: Nose normal.   Mouth/Throat: Uvula is midline, oropharynx is clear and moist and mucous membranes are normal. Abnormal dentition (missing all teeth).   Eyes: Conjunctivae are normal.   Neck: Normal range of motion. Neck supple.   Cardiovascular: Normal rate, regular rhythm and normal heart sounds.    No murmur heard.  Pulmonary/Chest: Effort normal and breath sounds normal. She has no wheezes.   Abdominal: Soft. Bowel sounds are normal.   Musculoskeletal: She exhibits no edema or deformity.   Lymphadenopathy:     She has no cervical adenopathy.   Neurological: She is alert and oriented to person, place, and time. No cranial nerve deficit.   Skin: Skin is warm and dry.   Psychiatric: She has a normal mood and affect. Her behavior is normal.   Nursing note and vitals reviewed.        Assessment/Plan   Shruthi was seen today for establish care.    Diagnoses and all orders for this visit:    Severe episode of recurrent major depressive disorder, without psychotic features (CMS/HCC)  -     sertraline (ZOLOFT) 100 MG tablet; Take 1 tablet by mouth Daily.  -     traZODone (DESYREL) 100 MG tablet; Take 1 tablet by mouth Every Night.  -     Comprehensive Metabolic Panel; Future  -     CBC & Differential; Future    Pre-diabetes  -     Comprehensive Metabolic Panel; Future  -     CBC & Differential; Future  -     Hemoglobin A1c; Future  -     Lipid Panel; Future    Restless leg syndrome  -     pramipexole (MIRAPEX) 1.5 MG  tablet; Take 1 tablet by mouth Every Night.  -     Comprehensive Metabolic Panel; Future  -     CBC & Differential; Future    Other migraine without status migrainosus, not intractable  -     topiramate (TOPAMAX) 200 MG tablet; Take 1 tablet by mouth Every Night.  -     Comprehensive Metabolic Panel; Future  -     CBC & Differential; Future    Chronic left-sided low back pain with left-sided sciatica  -     Comprehensive Metabolic Panel; Future  -     CBC & Differential; Future  -     Pain Management Profile (13 Drugs) Urine - Urine, Clean Catch    Anxiety  -     busPIRone (BUSPAR) 7.5 MG tablet; Take 1 tablet by mouth 3 (Three) Times a Day.  -     sertraline (ZOLOFT) 100 MG tablet; Take 1 tablet by mouth Daily.  -     Comprehensive Metabolic Panel; Future  -     CBC & Differential; Future    Current smoker  -     albuterol (PROVENTIL HFA;VENTOLIN HFA) 108 (90 Base) MCG/ACT inhaler; Inhale 2 puffs Every 4 (Four) Hours As Needed for Wheezing.  -     Comprehensive Metabolic Panel; Future  -     CBC & Differential; Future  -     Lipid Panel; Future    Overweight (BMI 25.0-29.9)    Screening for hyperlipidemia  -     Comprehensive Metabolic Panel; Future  -     CBC & Differential; Future  -     Lipid Panel; Future    BMI 28.0-28.9,adult      Medications resumed.   Drug screen collected today, will wait for results before considering resuming gabapentin.   Labs ordered, she will return fasting to complete.   Encouraged her to stop smoking, she has no desire to stop at this time.

## 2018-11-08 NOTE — PATIENT INSTRUCTIONS
Serving Sizes  A serving size is a measured amount of food or drink, such as one slice of bread, that has an associated nutrient content. Knowing the serving size of a food or drink can help you determine how much of that food you should consume.  What is the size of one serving?  The size of one healthy serving depends on the food or drink. To determine a serving size, read the food label. If the food or drink does not have a food label, try to find serving size information online. Or, use the following to estimate the size of one adult serving:  Grain  1 slice bread. ½ bagel. ½ cup pasta.  Vegetable  ½ cup cooked or canned vegetables. 1 cup raw, leafy greens.  Fruit  ½ cup canned fruit. 1 medium fruit. ¼ cup dried fruit.  Meat and Other Protein Sources  1 oz meat, poultry, or fish. ¼ cup cooked beans. 1 egg. ¼ cup nuts or seeds. 1 Tbsp nut butter. ¼ cup tofu or tempeh. 2 Tbsp hummus.  Dairy  An individual container of yogurt (6-8 oz). 1 piece of cheese the size of your thumb (1 oz). 1 cup (8 oz) milk or milk alternative.  Fat  A piece the size of one dice. 1 tsp soft margarine. 1 Tbsp mayonnaise. 1 tsp vegetable oil. 1 Tbsp regular salad dressing. 2 Tbsp low-fat salad dressing.  How many servings should I eat from each food group each day?  The following are the suggested number of servings to try and have every day from each food group. You can also look at your eating throughout the week and aim for meeting these requirements on most days for overall healthy eating.  Grain  6-8 servings. Try to have half of your grains from whole grains, such as whole wheat bread, corn tortillas, oatmeal, brown rice, whole wheat pasta, and bulgur.  Vegetable  At least 2½-3 servings.  Fruit  2 servings.  Meat and Other Protein Foods  5-6 servings. Aim to have lean proteins, such as chicken, turkey, fish, beans, or tofu.  Dairy  3 servings. Choose low-fat or nonfat if you are trying to control your weight.  Fat  2-3  servings.  Is a serving the same thing as a portion?  No. A portion is the actual amount you eat, which may be more than one serving. Knowing the specific serving size of a food and the nutritional information that goes with it can help you make a healthy decision on what size portion to eat.  What are some tips to help me learn healthy serving sizes?  · Check food labels for serving sizes. Many foods that come as a single portion actually contain multiple servings.  · Determine the serving size of foods you commonly eat and figure out how large a portion you usually eat.  · Measure the number of servings that can be held by the bowls, glasses, cups, and plates you typically use. For example, pour your breakfast cereal into your regular bowl and then pour it into a measuring cup.  · For 1-2 days, measure the serving sizes of all the foods you eat.  · Practice estimating serving sizes and determining how big your portions should be.  This information is not intended to replace advice given to you by your health care provider. Make sure you discuss any questions you have with your health care provider.  Document Released: 09/15/2004 Document Revised: 08/12/2017 Document Reviewed: 03/17/2015  Elsevier Interactive Patient Education © 2018 [employer]

## 2018-11-13 ENCOUNTER — RESULTS ENCOUNTER (OUTPATIENT)
Dept: FAMILY MEDICINE CLINIC | Facility: CLINIC | Age: 48
End: 2018-11-13

## 2018-11-13 DIAGNOSIS — G89.29 CHRONIC LEFT-SIDED LOW BACK PAIN WITH LEFT-SIDED SCIATICA: ICD-10-CM

## 2018-11-13 DIAGNOSIS — Z13.220 SCREENING FOR HYPERLIPIDEMIA: ICD-10-CM

## 2018-11-13 DIAGNOSIS — M54.42 CHRONIC LEFT-SIDED LOW BACK PAIN WITH LEFT-SIDED SCIATICA: ICD-10-CM

## 2018-11-13 DIAGNOSIS — G25.81 RESTLESS LEG SYNDROME: ICD-10-CM

## 2018-11-13 DIAGNOSIS — G43.809 OTHER MIGRAINE WITHOUT STATUS MIGRAINOSUS, NOT INTRACTABLE: ICD-10-CM

## 2018-11-13 DIAGNOSIS — F41.9 ANXIETY: ICD-10-CM

## 2018-11-13 DIAGNOSIS — F33.2 SEVERE EPISODE OF RECURRENT MAJOR DEPRESSIVE DISORDER, WITHOUT PSYCHOTIC FEATURES (HCC): ICD-10-CM

## 2018-11-13 DIAGNOSIS — F17.200 CURRENT SMOKER: ICD-10-CM

## 2018-11-13 DIAGNOSIS — R73.03 PRE-DIABETES: ICD-10-CM

## 2018-11-13 LAB
ALBUMIN SERPL-MCNC: 4.61 G/DL (ref 3.2–4.8)
ALBUMIN/GLOB SERPL: 2 G/DL (ref 1.5–2.5)
ALP SERPL-CCNC: 81 U/L (ref 25–100)
ALT SERPL-CCNC: 19 U/L (ref 7–40)
AST SERPL-CCNC: 16 U/L (ref 0–33)
BASOPHILS # BLD AUTO: 0.03 10*3/MM3 (ref 0–0.2)
BASOPHILS NFR BLD AUTO: 0.3 % (ref 0–1)
BILIRUB SERPL-MCNC: 0.4 MG/DL (ref 0.3–1.2)
BUN SERPL-MCNC: 13 MG/DL (ref 9–23)
BUN/CREAT SERPL: 14.4 (ref 7–25)
CALCIUM SERPL-MCNC: 9.5 MG/DL (ref 8.7–10.4)
CHLORIDE SERPL-SCNC: 105 MMOL/L (ref 99–109)
CHOLEST SERPL-MCNC: 252 MG/DL (ref 0–200)
CO2 SERPL-SCNC: 26 MMOL/L (ref 20–31)
CREAT SERPL-MCNC: 0.9 MG/DL (ref 0.6–1.3)
EOSINOPHIL # BLD AUTO: 0.31 10*3/MM3 (ref 0–0.3)
EOSINOPHIL NFR BLD AUTO: 2.9 % (ref 0–3)
ERYTHROCYTE [DISTWIDTH] IN BLOOD BY AUTOMATED COUNT: 16.3 % (ref 11.3–14.5)
GLOBULIN SER CALC-MCNC: 2.3 GM/DL
GLUCOSE SERPL-MCNC: 106 MG/DL (ref 70–100)
HBA1C MFR BLD: 6.1 % (ref 4.8–5.6)
HCT VFR BLD AUTO: 42.9 % (ref 34.5–44)
HDLC SERPL-MCNC: 45 MG/DL (ref 40–60)
HGB BLD-MCNC: 13.4 G/DL (ref 11.5–15.5)
IMM GRANULOCYTES # BLD: 0.07 10*3/MM3 (ref 0–0.03)
IMM GRANULOCYTES NFR BLD: 0.7 % (ref 0–0.6)
LDLC SERPL CALC-MCNC: 165 MG/DL (ref 0–100)
LYMPHOCYTES # BLD AUTO: 2.08 10*3/MM3 (ref 0.6–4.8)
LYMPHOCYTES NFR BLD AUTO: 19.7 % (ref 24–44)
MCH RBC QN AUTO: 26.9 PG (ref 27–31)
MCHC RBC AUTO-ENTMCNC: 31.2 G/DL (ref 32–36)
MCV RBC AUTO: 86.1 FL (ref 80–99)
MONOCYTES # BLD AUTO: 0.82 10*3/MM3 (ref 0–1)
MONOCYTES NFR BLD AUTO: 7.8 % (ref 0–12)
NEUTROPHILS # BLD AUTO: 7.23 10*3/MM3 (ref 1.5–8.3)
NEUTROPHILS NFR BLD AUTO: 68.6 % (ref 41–71)
PLATELET # BLD AUTO: 285 10*3/MM3 (ref 150–450)
POTASSIUM SERPL-SCNC: 4.6 MMOL/L (ref 3.5–5.5)
PROT SERPL-MCNC: 6.9 G/DL (ref 5.7–8.2)
RBC # BLD AUTO: 4.98 10*6/MM3 (ref 3.89–5.14)
SODIUM SERPL-SCNC: 135 MMOL/L (ref 132–146)
TRIGL SERPL-MCNC: 210 MG/DL (ref 0–150)
VLDLC SERPL CALC-MCNC: 42 MG/DL
WBC # BLD AUTO: 10.54 10*3/MM3 (ref 3.5–10.8)

## 2018-11-14 DIAGNOSIS — E78.2 MIXED HYPERLIPIDEMIA: Primary | ICD-10-CM

## 2018-11-14 RX ORDER — ATORVASTATIN CALCIUM 40 MG/1
40 TABLET, FILM COATED ORAL DAILY
Qty: 90 TABLET | Refills: 1 | Status: SHIPPED | OUTPATIENT
Start: 2018-11-14 | End: 2018-11-14 | Stop reason: SDUPTHER

## 2018-11-14 RX ORDER — ATORVASTATIN CALCIUM 40 MG/1
40 TABLET, FILM COATED ORAL DAILY
Qty: 90 TABLET | Refills: 1 | Status: SHIPPED | OUTPATIENT
Start: 2018-11-14 | End: 2019-06-10 | Stop reason: SDUPTHER

## 2019-06-10 ENCOUNTER — OFFICE VISIT (OUTPATIENT)
Dept: FAMILY MEDICINE CLINIC | Facility: CLINIC | Age: 49
End: 2019-06-10

## 2019-06-10 VITALS
BODY MASS INDEX: 29.87 KG/M2 | HEART RATE: 94 BPM | DIASTOLIC BLOOD PRESSURE: 80 MMHG | WEIGHT: 174 LBS | RESPIRATION RATE: 18 BRPM | SYSTOLIC BLOOD PRESSURE: 118 MMHG | TEMPERATURE: 97 F

## 2019-06-10 DIAGNOSIS — F17.200 CURRENT SMOKER: ICD-10-CM

## 2019-06-10 DIAGNOSIS — R73.09 ELEVATED HEMOGLOBIN A1C: ICD-10-CM

## 2019-06-10 DIAGNOSIS — F33.2 SEVERE EPISODE OF RECURRENT MAJOR DEPRESSIVE DISORDER, WITHOUT PSYCHOTIC FEATURES (HCC): ICD-10-CM

## 2019-06-10 DIAGNOSIS — M20.11 HALLUX VALGUS OF RIGHT FOOT: ICD-10-CM

## 2019-06-10 DIAGNOSIS — G47.33 OSA (OBSTRUCTIVE SLEEP APNEA): ICD-10-CM

## 2019-06-10 DIAGNOSIS — Z91.09 ENVIRONMENTAL ALLERGIES: ICD-10-CM

## 2019-06-10 DIAGNOSIS — E78.2 MIXED HYPERLIPIDEMIA: Primary | ICD-10-CM

## 2019-06-10 DIAGNOSIS — G43.809 OTHER MIGRAINE WITHOUT STATUS MIGRAINOSUS, NOT INTRACTABLE: ICD-10-CM

## 2019-06-10 DIAGNOSIS — Z12.39 SCREENING FOR BREAST CANCER: ICD-10-CM

## 2019-06-10 DIAGNOSIS — F41.9 ANXIETY: ICD-10-CM

## 2019-06-10 DIAGNOSIS — G25.81 RESTLESS LEG SYNDROME: ICD-10-CM

## 2019-06-10 PROCEDURE — 99214 OFFICE O/P EST MOD 30 MIN: CPT | Performed by: FAMILY MEDICINE

## 2019-06-10 RX ORDER — ROPINIROLE 1 MG/1
1 TABLET, FILM COATED ORAL NIGHTLY
Qty: 90 TABLET | Refills: 1 | Status: SHIPPED | OUTPATIENT
Start: 2019-06-10 | End: 2019-08-30 | Stop reason: SDUPTHER

## 2019-06-10 RX ORDER — CETIRIZINE HYDROCHLORIDE 10 MG/1
10 TABLET ORAL DAILY
Qty: 90 TABLET | Refills: 1 | Status: SHIPPED | OUTPATIENT
Start: 2019-06-10 | End: 2020-04-10

## 2019-06-10 RX ORDER — ATORVASTATIN CALCIUM 40 MG/1
40 TABLET, FILM COATED ORAL DAILY
Qty: 90 TABLET | Refills: 1 | Status: SHIPPED | OUTPATIENT
Start: 2019-06-10 | End: 2021-07-19

## 2019-06-10 RX ORDER — TRAZODONE HYDROCHLORIDE 100 MG/1
100 TABLET ORAL NIGHTLY
Qty: 90 TABLET | Refills: 1 | Status: SHIPPED | OUTPATIENT
Start: 2019-06-10 | End: 2021-08-02 | Stop reason: SDUPTHER

## 2019-06-10 RX ORDER — ALBUTEROL SULFATE 90 UG/1
2 AEROSOL, METERED RESPIRATORY (INHALATION) EVERY 4 HOURS PRN
Qty: 3 INHALER | Refills: 1 | Status: SHIPPED | OUTPATIENT
Start: 2019-06-10 | End: 2020-06-10

## 2019-06-10 RX ORDER — BUSPIRONE HYDROCHLORIDE 7.5 MG/1
7.5 TABLET ORAL 3 TIMES DAILY
Qty: 270 TABLET | Refills: 1 | Status: SHIPPED | OUTPATIENT
Start: 2019-06-10 | End: 2021-07-19 | Stop reason: SDUPTHER

## 2019-06-10 RX ORDER — SERTRALINE HYDROCHLORIDE 100 MG/1
100 TABLET, FILM COATED ORAL DAILY
Qty: 90 TABLET | Refills: 1 | Status: SHIPPED | OUTPATIENT
Start: 2019-06-10 | End: 2021-07-19

## 2019-06-10 NOTE — PROGRESS NOTES
Subjective   Shruthi Boone is a 48 y.o. female.   Chief Complaint   Patient presents with   • Follow-up     Hyperlipidemia   This is a chronic problem. The current episode started more than 1 year ago. The problem is uncontrolled. Recent lipid tests were reviewed and are high. Factors aggravating her hyperlipidemia include fatty foods and smoking. Pertinent negatives include no chest pain or shortness of breath. Current antihyperlipidemic treatment includes statins. Compliance problems include adherence to diet and adherence to exercise.  Risk factors for coronary artery disease include dyslipidemia and a sedentary lifestyle.     She previously followed with podiatry, Dr. Mccullough.  History of hallux valgus of right foot.  She is requesting a new referral to a podiatrist, but is closer to her as she will be in [place] for the next few months caring for her mother.    She has a history of restless leg syndrome, currently treated with Mirapex.  This treatment is no longer effective for her, although states it is actually making her restless leg syndrome worse.  She does not think that she is ever taken Requip.    She has a history of elevated hemoglobin A1c, but no history of diabetes.  Labs are due.    She was recently prescribed Zyrtec from an urgent treatment center for treatment of allergies.  States that this did improve her symptoms, requesting refill.    She also reports that she has a history of obstructive sleep apnea treated with CPAP.  Requesting a referral to sleep medicine to address this condition.    She continues to smoke, no interest in quitting at this time.    Anxiety and depression are currently controlled with sertraline, buspirone, trazodone.    Headaches are stable with topamax.     The following portions of the patient's history were reviewed and updated as appropriate: allergies, current medications, past family history, past medical history, past social history, past surgical history and  problem list.    Review of Systems   Constitutional: Negative for chills and fever.   HENT: Negative.    Eyes: Negative.    Respiratory: Negative for chest tightness and shortness of breath.    Cardiovascular: Negative for chest pain, palpitations and leg swelling.   Gastrointestinal: Negative.    Musculoskeletal: Positive for arthralgias.   Skin: Negative for rash.   Allergic/Immunologic: Positive for environmental allergies.   Neurological: Negative for light-headedness and headache.   Hematological: Negative for adenopathy.   Psychiatric/Behavioral: Negative for agitation and depressed mood. The patient is not nervous/anxious.        Objective   Physical Exam   Constitutional: She is oriented to person, place, and time. She appears well-developed and well-nourished.   HENT:   Head: Normocephalic and atraumatic.   Right Ear: External ear normal.   Left Ear: External ear normal.   Nose: Nose normal.   Eyes: Conjunctivae are normal.   Neck: Neck supple.   Cardiovascular: Normal rate, regular rhythm and normal heart sounds.   No murmur heard.  Pulmonary/Chest: Effort normal and breath sounds normal. She has no wheezes.   Musculoskeletal: She exhibits no edema.   Lymphadenopathy:     She has no cervical adenopathy.   Neurological: She is alert and oriented to person, place, and time.   Skin: Skin is warm and dry.   Psychiatric: She has a normal mood and affect. Her behavior is normal.   Nursing note and vitals reviewed.        Assessment/Plan   Shruthi was seen today for follow-up.    Diagnoses and all orders for this visit:    Mixed hyperlipidemia  -     CBC & Differential; Future  -     Comprehensive Metabolic Panel; Future  -     Lipid Panel; Future  -     atorvastatin (LIPITOR) 40 MG tablet; Take 1 tablet by mouth Daily.    Elevated hemoglobin A1c  -     CBC & Differential; Future  -     Comprehensive Metabolic Panel; Future  -     Hemoglobin A1c; Future    Severe episode of recurrent major depressive disorder,  without psychotic features (CMS/HCC)  -     CBC & Differential; Future  -     Comprehensive Metabolic Panel; Future  -     sertraline (ZOLOFT) 100 MG tablet; Take 1 tablet by mouth Daily.  -     traZODone (DESYREL) 100 MG tablet; Take 1 tablet by mouth Every Night.    Anxiety  -     CBC & Differential; Future  -     Comprehensive Metabolic Panel; Future  -     busPIRone (BUSPAR) 7.5 MG tablet; Take 1 tablet by mouth 3 (Three) Times a Day.  -     sertraline (ZOLOFT) 100 MG tablet; Take 1 tablet by mouth Daily.    Environmental allergies  -     cetirizine (zyrTEC) 10 MG tablet; Take 1 tablet by mouth Daily.    Other migraine without status migrainosus, not intractable  -     topiramate (TOPAMAX) 200 MG tablet; Take 1 tablet by mouth Every Night.    Hallux valgus of right foot  -     Ambulatory Referral to Podiatry    ARISTIDES (obstructive sleep apnea)  -     Ambulatory Referral to Sleep Medicine    Restless leg syndrome  -     rOPINIRole (REQUIP) 1 MG tablet; Take 1 tablet by mouth Every Night. Take 1 hour before bedtime.    Current smoker  -     albuterol sulfate  (90 Base) MCG/ACT inhaler; Inhale 2 puffs Every 4 (Four) Hours As Needed for Wheezing or Shortness of Air.    Screening for breast cancer  -     Mammo Screening Digital Tomosynthesis Bilateral With CAD      Change Mirapex to Requip to improve RLS.   She will return to complete fasting labs.   She made requests to be referred to podiatry and sleep medicine.

## 2019-06-24 ENCOUNTER — RESULTS ENCOUNTER (OUTPATIENT)
Dept: FAMILY MEDICINE CLINIC | Facility: CLINIC | Age: 49
End: 2019-06-24

## 2019-06-24 DIAGNOSIS — R73.09 ELEVATED HEMOGLOBIN A1C: ICD-10-CM

## 2019-06-24 DIAGNOSIS — F41.9 ANXIETY: ICD-10-CM

## 2019-06-24 DIAGNOSIS — E78.2 MIXED HYPERLIPIDEMIA: ICD-10-CM

## 2019-06-24 DIAGNOSIS — F33.2 SEVERE EPISODE OF RECURRENT MAJOR DEPRESSIVE DISORDER, WITHOUT PSYCHOTIC FEATURES (HCC): ICD-10-CM

## 2019-07-29 DIAGNOSIS — G43.809 OTHER MIGRAINE WITHOUT STATUS MIGRAINOSUS, NOT INTRACTABLE: Primary | ICD-10-CM

## 2019-08-30 ENCOUNTER — OFFICE VISIT (OUTPATIENT)
Dept: NEUROLOGY | Facility: CLINIC | Age: 49
End: 2019-08-30

## 2019-08-30 VITALS
BODY MASS INDEX: 29.71 KG/M2 | DIASTOLIC BLOOD PRESSURE: 90 MMHG | HEART RATE: 91 BPM | OXYGEN SATURATION: 99 % | WEIGHT: 174 LBS | HEIGHT: 64 IN | SYSTOLIC BLOOD PRESSURE: 138 MMHG | RESPIRATION RATE: 16 BRPM

## 2019-08-30 DIAGNOSIS — G43.C0 PERIODIC HEADACHE SYNDROME, NOT INTRACTABLE: ICD-10-CM

## 2019-08-30 DIAGNOSIS — G25.81 RESTLESS LEG SYNDROME: ICD-10-CM

## 2019-08-30 DIAGNOSIS — G47.33 OBSTRUCTIVE SLEEP APNEA: Primary | ICD-10-CM

## 2019-08-30 PROCEDURE — 99244 OFF/OP CNSLTJ NEW/EST MOD 40: CPT | Performed by: PSYCHIATRY & NEUROLOGY

## 2019-08-30 RX ORDER — SUMATRIPTAN 100 MG/1
TABLET, FILM COATED ORAL
Qty: 12 TABLET | Refills: 6 | Status: SHIPPED | OUTPATIENT
Start: 2019-08-30 | End: 2020-11-06

## 2019-08-30 RX ORDER — ROPINIROLE 1 MG/1
2 TABLET, FILM COATED ORAL NIGHTLY
Qty: 180 TABLET | Refills: 1 | Status: SHIPPED | OUTPATIENT
Start: 2019-08-30 | End: 2020-11-06

## 2019-08-30 RX ORDER — TRIAMCINOLONE ACETONIDE 5 MG/G
CREAM TOPICAL
COMMUNITY
Start: 2019-08-21

## 2019-08-30 NOTE — PROGRESS NOTES
Subjective   Patient ID: Shruthi Boone is a 49 y.o. female     Chief Complaint   Patient presents with   • Migraine        History of Present Illness    49 y.o. female referred by Dr Lisa Wallis for ARISTIDES.  Dx with ARISTIDES in 2015,  Moderate intensity AHI 22.  Trouble with mask fit and only sleeping for 2 - 3 hours a night.      Legs are restless and awaken her from sleep.  Requip improving sx by 60%.    HA frequency is 2 times a week.  Lasting for 3 hours.  Moderate to severe intensity.  Holocranial location.  Throbbing quality.     Reviewed medical records:    PMH of RLS treated with Mirapex.      ARISTIDES treated with CPAP.  In home sleep study 2015.  In labs study 2018.      Past Medical History:   Diagnosis Date   • Anxiety    • Arthritis     LEFT SHOULDER WITH BONE SPURS   • Asthma     AS A CHILD   • Bunion of right foot    • Depression    • Diabetes mellitus (CMS/HCC)     PREDIABETIC   • GERD (gastroesophageal reflux disease)    • Hyperlipemia    • Lower back injury    • Lumbar herniated disc    • Migraines    • No natural teeth    • Sleep apnea     NO BIPAP OR CPAP   • Spine pain    • Tattoo     X4   • Wears glasses      Family History   Problem Relation Age of Onset   • No Known Problems Mother    • Heart disease Father    • Hyperlipidemia Father    • Liver disease Father    • Thyroid disease Father    • Hypertension Father    • Kidney disease Father    • Stroke Father    • Heart attack Father    • Arthritis Father    • Diabetes Sister    • Diabetes Maternal Grandmother    • Cancer Maternal Grandmother         throat & lung     Social History     Socioeconomic History   • Marital status:      Spouse name: Not on file   • Number of children: Not on file   • Years of education: Not on file   • Highest education level: Not on file   Tobacco Use   • Smoking status: Current Every Day Smoker     Packs/day: 0.50     Years: 34.00     Pack years: 17.00     Types: Cigarettes   • Smokeless tobacco: Never Used  "  Substance and Sexual Activity   • Alcohol use: Yes   • Drug use: No     Types: Marijuana   • Sexual activity: Defer       Review of Systems   Constitutional: Positive for fatigue. Negative for activity change and unexpected weight change.   HENT: Negative for tinnitus and trouble swallowing.    Eyes: Negative for photophobia and visual disturbance.   Respiratory: Negative for apnea, cough and choking.    Cardiovascular: Negative for leg swelling.   Gastrointestinal: Negative for nausea and vomiting.   Endocrine: Negative for cold intolerance and heat intolerance.   Genitourinary: Negative for difficulty urinating, frequency, menstrual problem and urgency.   Musculoskeletal: Negative for back pain, gait problem, myalgias and neck pain.   Skin: Negative for color change and rash.   Allergic/Immunologic: Negative for immunocompromised state.   Neurological: Positive for headaches. Negative for dizziness, tremors, seizures, syncope, facial asymmetry, speech difficulty, weakness, light-headedness and numbness.   Hematological: Negative for adenopathy. Does not bruise/bleed easily.   Psychiatric/Behavioral: Positive for sleep disturbance. Negative for behavioral problems, confusion, decreased concentration and hallucinations.       Objective     Vitals:    08/30/19 1527   BP: 138/90   BP Location: Right arm   Patient Position: Sitting   Cuff Size: Adult   Pulse: 91   Resp: 16   SpO2: 99%   Weight: 78.9 kg (174 lb)   Height: 162.6 cm (64\")       Neurologic Exam     Mental Status   Oriented to person, place, and time.   Registration: recalls 3 of 3 objects. Recall at 5 minutes: recalls 3 of 3 objects. Follows 3 step commands.   Attention: normal. Concentration: normal.   Speech: speech is normal   Level of consciousness: alert  Knowledge: good and consistent with education.   Able to name object. Able to read. Able to repeat. Able to write. Normal comprehension.     Cranial Nerves     CN II   Visual fields full to " confrontation.   Visual acuity: normal  Right visual field deficit: none  Left visual field deficit: none     CN III, IV, VI   Pupils are equal, round, and reactive to light.  Extraocular motions are normal.   Right pupil: Shape: regular. Reactivity: brisk. Consensual response: intact.   Left pupil: Shape: regular. Reactivity: brisk. Consensual response: intact.   Nystagmus: none   Diplopia: none  Ophthalmoparesis: none  Upgaze: normal  Downgaze: normal  Conjugate gaze: present  Vestibulo-ocular reflex: present    CN V   Facial sensation intact.   Right corneal reflex: normal  Left corneal reflex: normal    CN VII   Right facial weakness: none  Left facial weakness: none    CN VIII   Hearing: intact    CN IX, X   Palate: symmetric  Right gag reflex: normal  Left gag reflex: normal    CN XI   Right sternocleidomastoid strength: normal  Left sternocleidomastoid strength: normal    CN XII   Tongue: not atrophic  Fasciculations: absent  Tongue deviation: none    Motor Exam   Muscle bulk: normal  Overall muscle tone: normal  Right arm tone: normal  Left arm tone: normal  Right leg tone: normal  Left leg tone: normal    Strength   Strength 5/5 throughout.     Sensory Exam   Light touch normal.   Vibration normal.   Proprioception normal.   Pinprick normal.     Gait, Coordination, and Reflexes     Gait  Gait: normal    Coordination   Romberg: negative  Finger to nose coordination: normal  Heel to shin coordination: normal  Tandem walking coordination: normal    Tremor   Resting tremor: absent  Intention tremor: absent  Action tremor: absent    Reflexes   Reflexes 2+ except as noted.       Physical Exam   Constitutional: She is oriented to person, place, and time. Vital signs are normal. She appears well-developed and well-nourished.   HENT:   Head: Normocephalic and atraumatic.   Eyes: EOM and lids are normal. Pupils are equal, round, and reactive to light.   Fundoscopic exam:       The right eye shows no exudate, no  hemorrhage and no papilledema. The right eye shows venous pulsations.        The left eye shows no exudate, no hemorrhage and no papilledema. The left eye shows venous pulsations.   Neck: Normal range of motion and phonation normal. Normal carotid pulses present. Carotid bruit is not present. No thyroid mass and no thyromegaly present.   Cardiovascular: Normal rate, regular rhythm and normal heart sounds.   Pulmonary/Chest: Effort normal.   Neurological: She is oriented to person, place, and time. She has normal strength. She has a normal Finger-Nose-Finger Test, a normal Heel to Shin Test, a normal Romberg Test and a normal Tandem Gait Test. Gait normal.   Skin: Skin is warm and dry.   Psychiatric: She has a normal mood and affect. Her speech is normal.   Nursing note and vitals reviewed.      Results Encounter on 11/13/2018   Component Date Value Ref Range Status   • Glucose 11/13/2018 106* 70 - 100 mg/dL Final   • BUN 11/13/2018 13  9 - 23 mg/dL Final   • Creatinine 11/13/2018 0.90  0.60 - 1.30 mg/dL Final   • eGFR Non  Am 11/13/2018 67  >60 mL/min/1.73 Final    Comment: [employer] Guidelines  Stage     Description        GFR  1         Normal or High     90+  2         Mild decrease      60-89  3         Moderate decrease  30-59  4         Severe decrease    15-29  5         Kidney failure     <15  The MDRD GFR formula is only valid for adults with stable  renal function between ages 18 and 70.     • eGFR  Am 11/13/2018 81  >60 mL/min/1.73 Final   • BUN/Creatinine Ratio 11/13/2018 14.4  7.0 - 25.0 Final   • Sodium 11/13/2018 135  132 - 146 mmol/L Final   • Potassium 11/13/2018 4.6  3.5 - 5.5 mmol/L Final   • Chloride 11/13/2018 105  99 - 109 mmol/L Final   • Total CO2 11/13/2018 26.0  20.0 - 31.0 mmol/L Final   • Calcium 11/13/2018 9.5  8.7 - 10.4 mg/dL Final   • Total Protein 11/13/2018 6.9  5.7 - 8.2 g/dL Final   • Albumin 11/13/2018 4.61  3.20 - 4.80 g/dL Final   • Globulin  11/13/2018 2.3  gm/dL Final   • A/G Ratio 11/13/2018 2.0  1.5 - 2.5 g/dL Final   • Total Bilirubin 11/13/2018 0.4  0.3 - 1.2 mg/dL Final   • Alkaline Phosphatase 11/13/2018 81  25 - 100 U/L Final   • AST (SGOT) 11/13/2018 16  0 - 33 U/L Final   • ALT (SGPT) 11/13/2018 19  7 - 40 U/L Final   • WBC 11/13/2018 10.54  3.50 - 10.80 10*3/mm3 Final   • RBC 11/13/2018 4.98  3.89 - 5.14 10*6/mm3 Final   • Hemoglobin 11/13/2018 13.4  11.5 - 15.5 g/dL Final   • Hematocrit 11/13/2018 42.9  34.5 - 44.0 % Final   • MCV 11/13/2018 86.1  80.0 - 99.0 fL Final   • MCH 11/13/2018 26.9* 27.0 - 31.0 pg Final   • MCHC 11/13/2018 31.2* 32.0 - 36.0 g/dL Final   • RDW 11/13/2018 16.3* 11.3 - 14.5 % Final   • Platelets 11/13/2018 285  150 - 450 10*3/mm3 Final   • Neutrophil Rel % 11/13/2018 68.6  41.0 - 71.0 % Final   • Lymphocyte Rel % 11/13/2018 19.7* 24.0 - 44.0 % Final   • Monocyte Rel % 11/13/2018 7.8  0.0 - 12.0 % Final   • Eosinophil Rel % 11/13/2018 2.9  0.0 - 3.0 % Final   • Basophil Rel % 11/13/2018 0.3  0.0 - 1.0 % Final   • Neutrophils Absolute 11/13/2018 7.23  1.50 - 8.30 10*3/mm3 Final   • Lymphocytes Absolute 11/13/2018 2.08  0.60 - 4.80 10*3/mm3 Final   • Monocytes Absolute 11/13/2018 0.82  0.00 - 1.00 10*3/mm3 Final   • Eosinophils Absolute 11/13/2018 0.31* 0.00 - 0.30 10*3/mm3 Final   • Basophils Absolute 11/13/2018 0.03  0.00 - 0.20 10*3/mm3 Final   • Immature Granulocyte Rel % 11/13/2018 0.7* 0.0 - 0.6 % Final   • Immature Grans Absolute 11/13/2018 0.07* 0.00 - 0.03 10*3/mm3 Final   • Hemoglobin A1C 11/13/2018 6.10* 4.80 - 5.60 % Final   • Total Cholesterol 11/13/2018 252* 0 - 200 mg/dL Final    Comment: Cholesterol Reference Ranges:   Desirable       < 200 mg/dL   Borderline    200-239 mg/dL   High Risk       > 239 mg/dL  Triglyceride Reference Ranges:   Normal          < 150 mg/dL   Borderline    150-199 mg/dL   High          200-499 mg/dL   Very High       > 499 mg/dL  HDL Reference Ranges:   Low              < 40  mg/dL   High             > 59 mg/dL  LDL Reference Ranges:   Optimal         < 100 mg/dL   Near Optimal  100-129 mg/dL   Borderline    130-159 mg/dL   High          160-189 mg/dL   Very High       > 189 mg/dL     • Triglycerides 11/13/2018 210* 0 - 150 mg/dL Final   • HDL Cholesterol 11/13/2018 45  40 - 60 mg/dL Final   • VLDL Cholesterol 11/13/2018 42  mg/dL Final   • LDL Cholesterol  11/13/2018 165* 0 - 100 mg/dL Final         Assessment/Plan     Problem List Items Addressed This Visit        Cardiovascular and Mediastinum    Periodic headache syndrome, not intractable    Current Assessment & Plan     Headaches are unchanged.  Medication changes per orders.     Add Imitrex 100 mg     Continue  mg qhs                Relevant Medications    sertraline (ZOLOFT) 100 MG tablet    topiramate (TOPAMAX) 200 MG tablet    traZODone (DESYREL) 100 MG tablet    SUMAtriptan (IMITREX) 100 MG tablet       Respiratory    Obstructive sleep apnea - Primary    Current Assessment & Plan     ARISTIDES with AHI 22     Treated with CPAP    DME:  [facility] in [place].              Other    Restless leg syndrome    Current Assessment & Plan     Started Requip 1 mg qhs with partial response.     Increase Requip 2 mg qhs          Relevant Medications    rOPINIRole (REQUIP) 1 MG tablet             Return in about 6 months (around 2/29/2020).

## 2019-08-30 NOTE — ASSESSMENT & PLAN NOTE
Headaches are unchanged.  Medication changes per orders.     Add Imitrex 100 mg     Continue  mg qhs

## 2020-04-10 DIAGNOSIS — Z91.09 ENVIRONMENTAL ALLERGIES: ICD-10-CM

## 2020-04-10 RX ORDER — CETIRIZINE HYDROCHLORIDE 10 MG/1
TABLET ORAL
Qty: 30 TABLET | Refills: 0 | Status: SHIPPED | OUTPATIENT
Start: 2020-04-10 | End: 2021-07-19 | Stop reason: SDUPTHER

## 2020-06-09 DIAGNOSIS — F17.200 CURRENT SMOKER: ICD-10-CM

## 2020-06-10 RX ORDER — ALBUTEROL SULFATE 90 UG/1
AEROSOL, METERED RESPIRATORY (INHALATION)
Qty: 18 G | Refills: 0 | Status: SHIPPED | OUTPATIENT
Start: 2020-06-10 | End: 2021-07-19 | Stop reason: SDUPTHER

## 2020-11-05 DIAGNOSIS — F17.200 CURRENT SMOKER: ICD-10-CM

## 2020-11-05 DIAGNOSIS — G25.81 RESTLESS LEG SYNDROME: ICD-10-CM

## 2020-11-05 RX ORDER — ALBUTEROL SULFATE 90 UG/1
AEROSOL, METERED RESPIRATORY (INHALATION)
Qty: 18 G | Refills: 0 | OUTPATIENT
Start: 2020-11-05

## 2020-11-06 RX ORDER — SUMATRIPTAN 100 MG/1
TABLET, FILM COATED ORAL
Qty: 9 TABLET | Refills: 5 | Status: SHIPPED | OUTPATIENT
Start: 2020-11-06 | End: 2021-07-19 | Stop reason: SDUPTHER

## 2020-11-06 RX ORDER — ROPINIROLE 1 MG/1
TABLET, FILM COATED ORAL
Qty: 60 TABLET | Refills: 0 | Status: SHIPPED | OUTPATIENT
Start: 2020-11-06 | End: 2021-03-15

## 2021-01-01 DIAGNOSIS — G25.81 RESTLESS LEG SYNDROME: ICD-10-CM

## 2021-01-04 DIAGNOSIS — G25.81 RESTLESS LEG SYNDROME: ICD-10-CM

## 2021-01-04 RX ORDER — ROPINIROLE 1 MG/1
TABLET, FILM COATED ORAL
Qty: 60 TABLET | Refills: 0 | OUTPATIENT
Start: 2021-01-04

## 2021-01-05 RX ORDER — ROPINIROLE 1 MG/1
TABLET, FILM COATED ORAL
Qty: 60 TABLET | Refills: 0 | OUTPATIENT
Start: 2021-01-05

## 2021-01-05 NOTE — TELEPHONE ENCOUNTER
Called 1x. No answer. I was unable to LVM to inform patient that no refills until she has been seen. If she wishes to schedule follow up, anyone can make this appointment.   -TMT

## 2021-03-15 DIAGNOSIS — G25.81 RESTLESS LEG SYNDROME: ICD-10-CM

## 2021-03-15 RX ORDER — ROPINIROLE 1 MG/1
TABLET, FILM COATED ORAL
Qty: 60 TABLET | Refills: 0 | Status: SHIPPED | OUTPATIENT
Start: 2021-03-15 | End: 2021-07-19 | Stop reason: SDUPTHER

## 2021-03-15 NOTE — TELEPHONE ENCOUNTER
Refill request: Ropinirole  We have been unable to get ahold of her for a follow up appt. Added a message on her script to please call the office.

## 2021-06-24 DIAGNOSIS — G25.81 RESTLESS LEG SYNDROME: ICD-10-CM

## 2021-06-24 RX ORDER — ROPINIROLE 1 MG/1
TABLET, FILM COATED ORAL
Qty: 60 TABLET | Refills: 0 | OUTPATIENT
Start: 2021-06-24

## 2021-06-24 NOTE — TELEPHONE ENCOUNTER
"Requip  Follow up: none on file, no showed last appt  Last filled:  3/15/2021, 30 day supply with no refills    Attempted to call patient and see if she is still taking this and about getting her in for a follow up, however her telephone rings a couple of times and then says \"we're sorry your call cannot be completed at this time, please hang up and try again later\"  FYI: When you call again later, it says the same thing.      "

## 2021-07-19 ENCOUNTER — LAB (OUTPATIENT)
Dept: LAB | Facility: HOSPITAL | Age: 51
End: 2021-07-19

## 2021-07-19 ENCOUNTER — OFFICE VISIT (OUTPATIENT)
Dept: FAMILY MEDICINE CLINIC | Facility: CLINIC | Age: 51
End: 2021-07-19

## 2021-07-19 VITALS
SYSTOLIC BLOOD PRESSURE: 124 MMHG | WEIGHT: 191.6 LBS | BODY MASS INDEX: 30.79 KG/M2 | OXYGEN SATURATION: 98 % | DIASTOLIC BLOOD PRESSURE: 72 MMHG | HEART RATE: 87 BPM | HEIGHT: 66 IN

## 2021-07-19 DIAGNOSIS — E55.9 VITAMIN D DEFICIENCY: ICD-10-CM

## 2021-07-19 DIAGNOSIS — G47.33 OBSTRUCTIVE SLEEP APNEA: ICD-10-CM

## 2021-07-19 DIAGNOSIS — R06.02 SHORTNESS OF BREATH: ICD-10-CM

## 2021-07-19 DIAGNOSIS — Z00.00 HEALTH CARE MAINTENANCE: ICD-10-CM

## 2021-07-19 DIAGNOSIS — G43.909 MIGRAINE WITHOUT STATUS MIGRAINOSUS, NOT INTRACTABLE, UNSPECIFIED MIGRAINE TYPE: ICD-10-CM

## 2021-07-19 DIAGNOSIS — R73.03 PREDIABETES: Primary | ICD-10-CM

## 2021-07-19 DIAGNOSIS — K21.9 GASTROESOPHAGEAL REFLUX DISEASE, UNSPECIFIED WHETHER ESOPHAGITIS PRESENT: ICD-10-CM

## 2021-07-19 DIAGNOSIS — F17.200 CURRENT SMOKER: ICD-10-CM

## 2021-07-19 DIAGNOSIS — R73.03 PREDIABETES: ICD-10-CM

## 2021-07-19 DIAGNOSIS — Z11.59 NEED FOR HEPATITIS C SCREENING TEST: ICD-10-CM

## 2021-07-19 DIAGNOSIS — Z12.11 COLON CANCER SCREENING: ICD-10-CM

## 2021-07-19 DIAGNOSIS — J45.20 MILD INTERMITTENT ASTHMA WITHOUT COMPLICATION: ICD-10-CM

## 2021-07-19 DIAGNOSIS — Z91.09 ENVIRONMENTAL ALLERGIES: ICD-10-CM

## 2021-07-19 DIAGNOSIS — I50.9 CHRONIC CONGESTIVE HEART FAILURE, UNSPECIFIED HEART FAILURE TYPE (HCC): ICD-10-CM

## 2021-07-19 DIAGNOSIS — F41.9 ANXIETY: ICD-10-CM

## 2021-07-19 DIAGNOSIS — E78.5 HYPERLIPIDEMIA, UNSPECIFIED HYPERLIPIDEMIA TYPE: ICD-10-CM

## 2021-07-19 DIAGNOSIS — G25.81 RESTLESS LEG SYNDROME: ICD-10-CM

## 2021-07-19 PROBLEM — H35.09: Status: ACTIVE | Noted: 2020-08-24

## 2021-07-19 LAB
ALBUMIN SERPL-MCNC: 4.5 G/DL (ref 3.5–5.2)
ALBUMIN/GLOB SERPL: 1.5 G/DL
ALP SERPL-CCNC: 91 U/L (ref 39–117)
ALT SERPL W P-5'-P-CCNC: 20 U/L (ref 1–33)
ANION GAP SERPL CALCULATED.3IONS-SCNC: 11.2 MMOL/L (ref 5–15)
AST SERPL-CCNC: 24 U/L (ref 1–32)
BASOPHILS # BLD AUTO: 0.06 10*3/MM3 (ref 0–0.2)
BASOPHILS NFR BLD AUTO: 0.7 % (ref 0–1.5)
BILIRUB SERPL-MCNC: 0.3 MG/DL (ref 0–1.2)
BUN SERPL-MCNC: 13 MG/DL (ref 6–20)
BUN/CREAT SERPL: 14.1 (ref 7–25)
CALCIUM SPEC-SCNC: 9.3 MG/DL (ref 8.6–10.5)
CHLORIDE SERPL-SCNC: 104 MMOL/L (ref 98–107)
CHOLEST SERPL-MCNC: 241 MG/DL (ref 0–200)
CO2 SERPL-SCNC: 23.8 MMOL/L (ref 22–29)
CREAT SERPL-MCNC: 0.92 MG/DL (ref 0.57–1)
DEPRECATED RDW RBC AUTO: 46.6 FL (ref 37–54)
EOSINOPHIL # BLD AUTO: 0.15 10*3/MM3 (ref 0–0.4)
EOSINOPHIL NFR BLD AUTO: 1.7 % (ref 0.3–6.2)
ERYTHROCYTE [DISTWIDTH] IN BLOOD BY AUTOMATED COUNT: 15 % (ref 12.3–15.4)
GFR SERPL CREATININE-BSD FRML MDRD: 65 ML/MIN/1.73
GLOBULIN UR ELPH-MCNC: 3 GM/DL
GLUCOSE SERPL-MCNC: 144 MG/DL (ref 65–99)
HBA1C MFR BLD: 6 % (ref 4.8–5.6)
HCT VFR BLD AUTO: 40.2 % (ref 34–46.6)
HDLC SERPL-MCNC: 33 MG/DL (ref 40–60)
HGB BLD-MCNC: 13 G/DL (ref 12–15.9)
IMM GRANULOCYTES # BLD AUTO: 0.15 10*3/MM3 (ref 0–0.05)
IMM GRANULOCYTES NFR BLD AUTO: 1.7 % (ref 0–0.5)
LDLC SERPL CALC-MCNC: 105 MG/DL (ref 0–100)
LDLC/HDLC SERPL: 2.67 {RATIO}
LYMPHOCYTES # BLD AUTO: 1.75 10*3/MM3 (ref 0.7–3.1)
LYMPHOCYTES NFR BLD AUTO: 19.8 % (ref 19.6–45.3)
MCH RBC QN AUTO: 27.6 PG (ref 26.6–33)
MCHC RBC AUTO-ENTMCNC: 32.3 G/DL (ref 31.5–35.7)
MCV RBC AUTO: 85.4 FL (ref 79–97)
MONOCYTES # BLD AUTO: 0.64 10*3/MM3 (ref 0.1–0.9)
MONOCYTES NFR BLD AUTO: 7.3 % (ref 5–12)
NEUTROPHILS NFR BLD AUTO: 6.07 10*3/MM3 (ref 1.7–7)
NEUTROPHILS NFR BLD AUTO: 68.8 % (ref 42.7–76)
NRBC BLD AUTO-RTO: 0 /100 WBC (ref 0–0.2)
PLATELET # BLD AUTO: 267 10*3/MM3 (ref 140–450)
PMV BLD AUTO: 10.4 FL (ref 6–12)
POTASSIUM SERPL-SCNC: 4.2 MMOL/L (ref 3.5–5.2)
PROT SERPL-MCNC: 7.5 G/DL (ref 6–8.5)
RBC # BLD AUTO: 4.71 10*6/MM3 (ref 3.77–5.28)
SODIUM SERPL-SCNC: 139 MMOL/L (ref 136–145)
TRIGL SERPL-MCNC: 600 MG/DL (ref 0–150)
VLDLC SERPL-MCNC: 103 MG/DL (ref 5–40)
WBC # BLD AUTO: 8.82 10*3/MM3 (ref 3.4–10.8)

## 2021-07-19 PROCEDURE — 83880 ASSAY OF NATRIURETIC PEPTIDE: CPT

## 2021-07-19 PROCEDURE — 80061 LIPID PANEL: CPT

## 2021-07-19 PROCEDURE — 99214 OFFICE O/P EST MOD 30 MIN: CPT | Performed by: NURSE PRACTITIONER

## 2021-07-19 PROCEDURE — 82306 VITAMIN D 25 HYDROXY: CPT

## 2021-07-19 PROCEDURE — 80053 COMPREHEN METABOLIC PANEL: CPT

## 2021-07-19 PROCEDURE — 85025 COMPLETE CBC W/AUTO DIFF WBC: CPT

## 2021-07-19 PROCEDURE — 86803 HEPATITIS C AB TEST: CPT

## 2021-07-19 PROCEDURE — 36415 COLL VENOUS BLD VENIPUNCTURE: CPT

## 2021-07-19 PROCEDURE — 83036 HEMOGLOBIN GLYCOSYLATED A1C: CPT

## 2021-07-19 RX ORDER — NICOTINE 21 MG/24HR
1 PATCH, TRANSDERMAL 24 HOURS TRANSDERMAL EVERY 24 HOURS
Qty: 30 EACH | Refills: 1 | Status: SHIPPED | OUTPATIENT
Start: 2021-07-19

## 2021-07-19 RX ORDER — BUDESONIDE AND FORMOTEROL FUMARATE DIHYDRATE 160; 4.5 UG/1; UG/1
2 AEROSOL RESPIRATORY (INHALATION)
COMMUNITY
End: 2021-07-19 | Stop reason: SDUPTHER

## 2021-07-19 RX ORDER — BUSPIRONE HYDROCHLORIDE 7.5 MG/1
7.5 TABLET ORAL 3 TIMES DAILY
Qty: 270 TABLET | Refills: 1 | Status: SHIPPED | OUTPATIENT
Start: 2021-07-19 | End: 2023-03-09 | Stop reason: SDUPTHER

## 2021-07-19 RX ORDER — CETIRIZINE HYDROCHLORIDE 10 MG/1
10 TABLET ORAL DAILY
Qty: 90 TABLET | Refills: 1 | Status: SHIPPED | OUTPATIENT
Start: 2021-07-19

## 2021-07-19 RX ORDER — BUDESONIDE AND FORMOTEROL FUMARATE DIHYDRATE 160; 4.5 UG/1; UG/1
2 AEROSOL RESPIRATORY (INHALATION)
Qty: 10.2 G | Refills: 3 | Status: SHIPPED | OUTPATIENT
Start: 2021-07-19 | End: 2021-09-21 | Stop reason: SDUPTHER

## 2021-07-19 RX ORDER — FLUOXETINE HYDROCHLORIDE 20 MG/1
CAPSULE ORAL
COMMUNITY
Start: 2021-06-24 | End: 2021-07-19 | Stop reason: SDUPTHER

## 2021-07-19 RX ORDER — SUMATRIPTAN 100 MG/1
TABLET, FILM COATED ORAL
Qty: 9 TABLET | Refills: 5 | Status: SHIPPED | OUTPATIENT
Start: 2021-07-19

## 2021-07-19 RX ORDER — FLUOXETINE HYDROCHLORIDE 20 MG/1
20 CAPSULE ORAL DAILY
Qty: 90 CAPSULE | Refills: 1 | Status: SHIPPED | OUTPATIENT
Start: 2021-07-19 | End: 2021-08-02 | Stop reason: SDUPTHER

## 2021-07-19 RX ORDER — ATORVASTATIN CALCIUM 80 MG/1
80 TABLET, FILM COATED ORAL NIGHTLY
Qty: 90 TABLET | Refills: 0 | Status: SHIPPED | OUTPATIENT
Start: 2021-07-19 | End: 2022-05-24

## 2021-07-19 RX ORDER — MELATONIN
1000 DAILY
COMMUNITY

## 2021-07-19 RX ORDER — ALBUTEROL SULFATE 90 UG/1
2 AEROSOL, METERED RESPIRATORY (INHALATION) EVERY 4 HOURS PRN
Qty: 18 G | Refills: 0 | Status: SHIPPED | OUTPATIENT
Start: 2021-07-19 | End: 2021-09-21 | Stop reason: SDUPTHER

## 2021-07-19 RX ORDER — ROPINIROLE 1 MG/1
2 TABLET, FILM COATED ORAL NIGHTLY
Qty: 60 TABLET | Refills: 0 | Status: SHIPPED | OUTPATIENT
Start: 2021-07-19 | End: 2022-05-24

## 2021-07-19 RX ORDER — ATORVASTATIN CALCIUM 80 MG/1
TABLET, FILM COATED ORAL
COMMUNITY
Start: 2021-06-24 | End: 2021-07-19

## 2021-07-19 NOTE — PROGRESS NOTES
Subjective   Shruthi Boone is a 50 y.o. female here to establish care.  Chief Complaint   Patient presents with   • Establish Care   • Med Refill     Pt needs refill on all medications   • Asthma   • Anxiety   • Hyperlipidemia   • Headache   • Shortness of Breath   • Restless Legs Syndrome   • Sleep Apnea       History of Present Illness   Patient is here to establish care, previous PCP was in [place], patient moved to [place] last month. Is out of her medications. She has not had recent labs, was told she was a prediabetic a few years ago.   Sees a therapist for depression and anxiety, on prozac and buspirone.   sees Dr. Garcia, neurology for RLS, on requip, and migraines, managed with topamax; out of medication, does not have an appt scheduled  Requests referral to pulmonology for management of sleep apnea and worsening shortness of breath, despite symbicort and albuterol for asthma. she does continue to smoke about 1/2 pack of cigarettes a day, down from 3 packs in the past. No recent PFT.  States she was hospitalized a couple of years ago, told she had CHF, not on diuretics.   The following portions of the patient's history were reviewed and updated as appropriate: allergies, current medications, past family history, past medical history, past social history, past surgical history and problem list.    Review of Systems   Constitutional: Negative for appetite change, chills, diaphoresis, fatigue, fever and unexpected weight change.   HENT: Negative for congestion, ear pain, hearing loss and trouble swallowing.    Eyes: Positive for visual disturbance (occ blurry). Negative for photophobia, pain and redness.   Respiratory: Positive for apnea (cpap), shortness of breath and wheezing. Negative for cough.    Cardiovascular: Positive for leg swelling. Negative for chest pain and palpitations.   Gastrointestinal: Negative for abdominal distention, abdominal pain, blood in stool, constipation, diarrhea, nausea and  "vomiting.   Genitourinary: Negative for difficulty urinating, dysuria and vaginal bleeding (last period was 2019).   Musculoskeletal: Positive for arthralgias, back pain, neck pain and neck stiffness.   Skin: Negative for color change, rash and wound.   Neurological: Positive for dizziness (occ), numbness (hands) and headaches (0-3 times a month).   Psychiatric/Behavioral: Positive for dysphoric mood (in therapy) and sleep disturbance. Negative for self-injury and suicidal ideas. The patient is nervous/anxious (controlled).      Blood pressure 124/72, pulse 87, height 167 cm (65.75\"), weight 86.9 kg (191 lb 9.6 oz), SpO2 98 %.    Allergies   Allergen Reactions   • Methadone GI Intolerance   • Aspirin Rash     Past Medical History:   Diagnosis Date   • Alcohol abuse     sober since 2003   • Anxiety    • Arthritis     LEFT SHOULDER WITH BONE SPURS   • Asthma     AS A CHILD   • Bunion of right foot    • Depression    • Diabetes mellitus (CMS/HCC)     PREDIABETIC   • GERD (gastroesophageal reflux disease)    • Hyperlipemia    • Lower back injury    • Lumbar herniated disc    • Menopause     last period  2019   • Migraines    • No natural teeth    • Sleep apnea     CPAP   • Spine pain    • Tattoo     X4   • Wears glasses      Past Surgical History:   Procedure Laterality Date   • BUNIONECTOMY Right 12/4/2017    Procedure: Right foot bunionectomy with osteotomy, double osteotomy with Rui osteotomy;  Surgeon: Reno Mccullough DPM;  Location: [facility];  Service:    • MULTIPLE TOOTH EXTRACTIONS     • TUBAL ABDOMINAL LIGATION       Family History   Problem Relation Age of Onset   • Obesity Mother    • Heart disease Father    • Hyperlipidemia Father    • Liver disease Father    • Thyroid disease Father    • Hypertension Father    • Kidney disease Father    • Stroke Father    • Heart attack Father    • Arthritis Father    • Diabetes Sister    • Diabetes Maternal Grandmother    • Lung cancer Maternal Grandmother      Social " History     Socioeconomic History   • Marital status:      Spouse name: Not on file   • Number of children: Not on file   • Years of education: Not on file   • Highest education level: Not on file   Tobacco Use   • Smoking status: Current Every Day Smoker     Packs/day: 0.50     Years: 34.00     Pack years: 17.00     Types: Cigarettes     Start date: 7/19/1983   • Smokeless tobacco: Never Used   Substance and Sexual Activity   • Alcohol use: Not Currently     Comment: quit in 2003   • Drug use: No     Types: Marijuana   • Sexual activity: Not Currently     Birth control/protection: Surgical     Immunization History   Administered Date(s) Administered   • COVID-19 ([place]) 05/11/2021   • Hepatitis A 01/17/2019       Current Outpatient Medications:   •  albuterol sulfate  (90 Base) MCG/ACT inhaler, Inhale 2 puffs Every 4 (Four) Hours As Needed for Wheezing or Shortness of Air., Disp: 18 g, Rfl: 0  •  atorvastatin (LIPITOR) 80 MG tablet, Take 1 tablet by mouth Every Night., Disp: 90 tablet, Rfl: 0  •  budesonide-formoterol (SYMBICORT) 160-4.5 MCG/ACT inhaler, Inhale 2 puffs 2 (Two) Times a Day., Disp: 10.2 g, Rfl: 3  •  busPIRone (BUSPAR) 7.5 MG tablet, Take 1 tablet by mouth 3 (Three) Times a Day., Disp: 270 tablet, Rfl: 1  •  cetirizine (zyrTEC) 10 MG tablet, Take 1 tablet by mouth Daily., Disp: 90 tablet, Rfl: 1  •  cholecalciferol (VITAMIN D3) 25 MCG (1000 UT) tablet, Take 1,000 Units by mouth Daily., Disp: , Rfl:   •  FLUoxetine (PROzac) 20 MG capsule, Take 1 capsule by mouth Daily., Disp: 90 capsule, Rfl: 1  •  rOPINIRole (REQUIP) 1 MG tablet, Take 2 tablets by mouth Every Night. Take 1 hour before bedtime., Disp: 60 tablet, Rfl: 0  •  SUMAtriptan (IMITREX) 100 MG tablet, TAKE ONE TABLET BY MOUTH AT ONSET OF HEADACHE; MAY REPEAT ONE TABLET IN 2 HOURS IF NEEDED., Disp: 9 tablet, Rfl: 5  •  topiramate (TOPAMAX) 200 MG tablet, Take 1 tablet by mouth Every Night., Disp: 90 tablet, Rfl: 1  •  traZODone  (DESYREL) 100 MG tablet, Take 1 tablet by mouth Every Night., Disp: 90 tablet, Rfl: 1  •  triamcinolone (KENALOG) 0.5 % cream, , Disp: , Rfl:   •  nicotine (Nicotine Step 2) 14 MG/24HR patch, Place 1 patch on the skin as directed by provider Daily., Disp: 30 each, Rfl: 1    Objective   Physical Exam  Vitals reviewed.   Constitutional:       General: She is not in acute distress.     Appearance: Normal appearance. She is obese. She is not ill-appearing, toxic-appearing or diaphoretic.   HENT:      Head: Normocephalic and atraumatic.   Neck:      Vascular: No carotid bruit.   Cardiovascular:      Rate and Rhythm: Normal rate and regular rhythm.   Pulmonary:      Effort: No respiratory distress.      Breath sounds: Normal breath sounds. No wheezing or rhonchi.      Comments: Increased effort of breathing  Abdominal:      Palpations: Abdomen is soft.   Musculoskeletal:      Right lower leg: Edema (trace BLE edema) present.      Left lower leg: Edema present.   Skin:     General: Skin is warm and dry.   Neurological:      Mental Status: She is alert and oriented to person, place, and time.   Psychiatric:         Mood and Affect: Mood normal.         Thought Content: Thought content normal.         Assessment/Plan   Diagnoses and all orders for this visit:    1. Prediabetes (Primary)  -     Hemoglobin A1c; Future    2. Hyperlipidemia, unspecified hyperlipidemia type  -     Comprehensive Metabolic Panel; Future  -     Lipid Panel; Future    3. Vitamin D deficiency  -     Vitamin D 25 Hydroxy; Future    4. Health care maintenance  -     CBC Auto Differential; Future  -     Comprehensive Metabolic Panel; Future  -     Lipid Panel; Future    5. Need for hepatitis C screening test  -     Hepatitis C Antibody; Future    6. Shortness of breath  -     proBNP; Future  -     Ambulatory Referral to Pulmonology    7. Current smoker  -     nicotine (Nicotine Step 2) 14 MG/24HR patch; Place 1 patch on the skin as directed by provider  Daily.  Dispense: 30 each; Refill: 1  -     Ambulatory Referral to Pulmonology  -     albuterol sulfate  (90 Base) MCG/ACT inhaler; Inhale 2 puffs Every 4 (Four) Hours As Needed for Wheezing or Shortness of Air.  Dispense: 18 g; Refill: 0    8. Colon cancer screening  -     Ambulatory Referral For Screening Colonoscopy    9. Obstructive sleep apnea  -     Ambulatory Referral to Pulmonology    10. Mild intermittent asthma without complication  -     Ambulatory Referral to Pulmonology  -     albuterol sulfate  (90 Base) MCG/ACT inhaler; Inhale 2 puffs Every 4 (Four) Hours As Needed for Wheezing or Shortness of Air.  Dispense: 18 g; Refill: 0  -     budesonide-formoterol (SYMBICORT) 160-4.5 MCG/ACT inhaler; Inhale 2 puffs 2 (Two) Times a Day.  Dispense: 10.2 g; Refill: 3    11. Restless leg syndrome  -     rOPINIRole (REQUIP) 1 MG tablet; Take 2 tablets by mouth Every Night. Take 1 hour before bedtime.  Dispense: 60 tablet; Refill: 0    12. Migraine without status migrainosus, not intractable, unspecified migraine type    13. Gastroesophageal reflux disease, unspecified whether esophagitis present    14. Chronic congestive heart failure, unspecified heart failure type (CMS/HCC)    15. Environmental allergies  -     cetirizine (zyrTEC) 10 MG tablet; Take 1 tablet by mouth Daily.  Dispense: 90 tablet; Refill: 1    16. Anxiety  -     busPIRone (BUSPAR) 7.5 MG tablet; Take 1 tablet by mouth 3 (Three) Times a Day.  Dispense: 270 tablet; Refill: 1    Other orders  -     SUMAtriptan (IMITREX) 100 MG tablet; TAKE ONE TABLET BY MOUTH AT ONSET OF HEADACHE; MAY REPEAT ONE TABLET IN 2 HOURS IF NEEDED.  Dispense: 9 tablet; Refill: 5  -     FLUoxetine (PROzac) 20 MG capsule; Take 1 capsule by mouth Daily.  Dispense: 90 capsule; Refill: 1  -     atorvastatin (LIPITOR) 80 MG tablet; Take 1 tablet by mouth Every Night.  Dispense: 90 tablet; Refill: 0      New Medications Ordered This Visit   Medications   • nicotine  (Nicotine Step 2) 14 MG/24HR patch     Sig: Place 1 patch on the skin as directed by provider Daily.     Dispense:  30 each     Refill:  1   • SUMAtriptan (IMITREX) 100 MG tablet     Sig: TAKE ONE TABLET BY MOUTH AT ONSET OF HEADACHE; MAY REPEAT ONE TABLET IN 2 HOURS IF NEEDED.     Dispense:  9 tablet     Refill:  5   • rOPINIRole (REQUIP) 1 MG tablet     Sig: Take 2 tablets by mouth Every Night. Take 1 hour before bedtime.     Dispense:  60 tablet     Refill:  0     Please call the office for follow up appt. Thanks!   • FLUoxetine (PROzac) 20 MG capsule     Sig: Take 1 capsule by mouth Daily.     Dispense:  90 capsule     Refill:  1   • cetirizine (zyrTEC) 10 MG tablet     Sig: Take 1 tablet by mouth Daily.     Dispense:  90 tablet     Refill:  1   • atorvastatin (LIPITOR) 80 MG tablet     Sig: Take 1 tablet by mouth Every Night.     Dispense:  90 tablet     Refill:  0   • albuterol sulfate  (90 Base) MCG/ACT inhaler     Sig: Inhale 2 puffs Every 4 (Four) Hours As Needed for Wheezing or Shortness of Air.     Dispense:  18 g     Refill:  0   • budesonide-formoterol (SYMBICORT) 160-4.5 MCG/ACT inhaler     Sig: Inhale 2 puffs 2 (Two) Times a Day.     Dispense:  10.2 g     Refill:  3   • busPIRone (BUSPAR) 7.5 MG tablet     Sig: Take 1 tablet by mouth 3 (Three) Times a Day.     Dispense:  270 tablet     Refill:  1       Records requested from previous primary provider as well as any consulting physician, admitting hospitals, etc. Further recommendations pending review.  Complex patient, follow up in one week  Screening labs ordered to evaluate chronic conditions. I will contact patient regarding test results and provide instructions regarding any necessary changes in plan of care.  Referred to pulmonology for sleep apnea, CPAP management, needs PFT to evaluate worsening shortness of breath. Refilled inhalers. Check BNP, patient endorses hx of CHF, consider echo or cardiology referral depending on  results.  Smoking cessation advised.  Pt voiced understanding of health risks of continued smoking. Agrees to try nicotine patches  Refilled requip and imitrex, needs to schedule follow up with neuro  Refilled buspar and prozac, continue to follow up with therapist  Patient was encouraged to keep me informed of any acute changes, lack of improvement, or any new concerning symptoms.

## 2021-07-20 LAB
25(OH)D3 SERPL-MCNC: 22.5 NG/ML (ref 30–100)
HCV AB SER DONR QL: NORMAL
NT-PROBNP SERPL-MCNC: 24.3 PG/ML (ref 0–450)

## 2021-08-02 ENCOUNTER — OFFICE VISIT (OUTPATIENT)
Dept: FAMILY MEDICINE CLINIC | Facility: CLINIC | Age: 51
End: 2021-08-02

## 2021-08-02 VITALS
OXYGEN SATURATION: 99 % | SYSTOLIC BLOOD PRESSURE: 134 MMHG | HEIGHT: 66 IN | HEART RATE: 100 BPM | BODY MASS INDEX: 31.12 KG/M2 | WEIGHT: 193.6 LBS | DIASTOLIC BLOOD PRESSURE: 82 MMHG

## 2021-08-02 DIAGNOSIS — Z12.4 CERVICAL CANCER SCREENING: ICD-10-CM

## 2021-08-02 DIAGNOSIS — F51.01 PRIMARY INSOMNIA: ICD-10-CM

## 2021-08-02 DIAGNOSIS — R20.2 NUMBNESS AND TINGLING SENSATION OF SKIN: ICD-10-CM

## 2021-08-02 DIAGNOSIS — R73.03 PREDIABETES: ICD-10-CM

## 2021-08-02 DIAGNOSIS — R20.0 NUMBNESS OF FINGERS: ICD-10-CM

## 2021-08-02 DIAGNOSIS — Z12.31 ENCOUNTER FOR SCREENING MAMMOGRAM FOR MALIGNANT NEOPLASM OF BREAST: ICD-10-CM

## 2021-08-02 DIAGNOSIS — R87.820 PAPANICOLAOU SMEAR OF CERVIX WITH LOW RISK HUMAN PAPILLOMAVIRUS (HPV) DNA TEST POSITIVE: ICD-10-CM

## 2021-08-02 DIAGNOSIS — Z00.00 ANNUAL PHYSICAL EXAM: Primary | ICD-10-CM

## 2021-08-02 DIAGNOSIS — R20.0 NUMBNESS AND TINGLING SENSATION OF SKIN: ICD-10-CM

## 2021-08-02 DIAGNOSIS — F33.2 SEVERE EPISODE OF RECURRENT MAJOR DEPRESSIVE DISORDER, WITHOUT PSYCHOTIC FEATURES (HCC): ICD-10-CM

## 2021-08-02 PROCEDURE — 99396 PREV VISIT EST AGE 40-64: CPT | Performed by: NURSE PRACTITIONER

## 2021-08-02 RX ORDER — FLUOXETINE HYDROCHLORIDE 20 MG/1
20 CAPSULE ORAL 3 TIMES DAILY
Qty: 270 CAPSULE | Refills: 1 | Status: SHIPPED | OUTPATIENT
Start: 2021-08-02 | End: 2023-03-09 | Stop reason: SDUPTHER

## 2021-08-02 RX ORDER — TRAZODONE HYDROCHLORIDE 100 MG/1
100 TABLET ORAL NIGHTLY
Qty: 90 TABLET | Refills: 1 | Status: SHIPPED | OUTPATIENT
Start: 2021-08-02 | End: 2022-05-24

## 2021-08-02 NOTE — PATIENT INSTRUCTIONS
"https://www.uspreventiveservicestaskforce.org/uspstf/recommendation/lung-cancer-screening\">   Cancer Screening for Women  A cancer screening is a test or exam that checks for cancer. Your health care provider will recommend specific cancer screenings based on your age, medical history (including risk factors), and family history of cancer. Work with your health care provider to create a cancer screening schedule that protects your health.  Who should have screening?  All women should be considered for screening of certain cancers, including breast cancer, cervical cancer, colorectal cancer, and skin cancer. Your health care provider may recommend screenings for other types of cancer if:  · You had cancer before.  · You have a family member with cancer.  · You have abnormal genes that could increase the risk of cancer.  · You have risk factors for certain cancers, such as current or past use of tobacco products, or being overweight.  When you should be screened for cancer depends on:  · Your age.  · Your medical history and your family's medical history.  · Certain lifestyle factors, such as smoking or other use of tobacco products.  · Environmental exposure, such as to asbestos.  How is screening done?  Breast cancer  Breast cancer screening is done with a test that takes images of breast tissue (mammogram). Here are some screening guidelines for women at average risk:  · When you are age 40-44, you will be given the choice to start having mammograms.  · When you are age 45-54, you should have a mammogram every year.  · You may start having mammograms before age 45 if you have risk factors for breast cancer, such as having an immediate family member with breast cancer.  · At age 55 or older, you should have a mammogram every 1-2 years for as long as you are in good health and have a life expectancy of 10 years or longer.  · It is important to know what your breasts look and feel like so you can report any changes to " your health care provider.    Cervical cancer  Cervical cancer screening is done with a Pap test. This testchecks for abnormalities in the lowest part of the uterus (cervix). This test may be performed along with an HPV (human papillomavirus) test to identify the virus that causes cervical cancer. To perform the test, a health care provider takes a swab of cells from yourcervix during a pelvic exam. Screening for cervical cancer with a Pap test should start at age 21. Here are some screening guidelines:  · When you are age 21-29, you should have a Pap test every 3 years.  · When you are age 30-65, you should have a Pap test and HPV test every 5 years or have a Pap test every 3 years.  · If you have risk factors for cervical cancer, you may be screened for cervical cancer more often.  · If results of your Pap tests are abnormal, you may have an HPV test.  · If you have had the HPV vaccine, you will still be screened for cervical cancer and follow normal screening recommendations.  You do not need to be screened for cervical cancer if any of the following apply to you:  · You are older than age 65 and you have not had a serious cervical pre-cancer or cancer in the last 20 years.  · Your cervix and uterus have been removed, and you have never had cervical cancer or abnormal cells that could become cancer (precancerous cells).  Colorectal cancer    All adults at average risk should be considered for screening for colorectal cancer starting at age 50 and continuing until age 75. Your health care provider may recommend screening at age 45 if you are at higher risk due to family history of colon or rectal cancer or other risk factors. You will have tests every 1-10 years, depending on your results and the type of screening test. Talk with your health care provider about which screening test is right for you and how often you should be screened.  Colorectal cancer screening looks for cancer or for growths called polyps that  often form before cancer starts. Tests to look for cancer or polyps include:  · Colonoscopy or flexible sigmoidoscopy. For these procedures, a flexible tube with a small camera is inserted into the rectum.  · CT colonography. This test uses X-rays and a contrast dye to check the colon for polyps. If a polyp is found, you may need to have a colonoscopy so the polyp can be located and removed.  Tests to look for cancer in the stool (feces) include:  · Guaiac-based fecal occult blood test (FOBT). This test can find blood in stool. It can be done at home with a kit.  · Fecal immunochemical test (FIT). This test can find blood in stool. For this test, you will need to collect stool samples at home.  · Stool DNA test. This test looks for blood in stool and any changes in DNA that can lead to colon cancer. For this test, you will need to collect a stool sample at home and send it to a lab.    Endometrial cancer  There is no standard screening test for endometrial cancer, and women at average risk do not routinely need to have this screening. Talk with your health care provider about whether screening is right for you. If it is, this screening is performed through:  · Endometrial tissue biopsy. This tests a sample of tissue taken from the lining of the uterus.  · Vaginal ultrasound.  If you are at increased risk for endometrial cancer, you may need to have these tests more often than normal. You are at increased risk if:  · You have a family history of ovarian, uterine, or colon cancer.  · You are taking tamoxifen, a medicine used to treat breast cancer.  · You have certain types of colon cancer.  If you have reached menopause, it is especially important to talk with your health care provider about any vaginal bleeding or spotting. Screening for endometrial cancer is not recommended for women who do not have symptoms of the cancer, such as vaginal bleeding.  Lung cancer  Lung cancer screening is done with a CT scan that  looks for abnormal changes in the lungs. Discuss lung cancer screening with your health care provider if you are age 55-74 and if any of the following apply to you:  · You currently smoke.  · You used to smoke heavily.  · You have a smoking history of 1 pack of cigarettes a day for 30 years or 2 packs a day for 15 years.  · You have quit smoking within the past 15 years.  You may need to be screened every year if you smoke heavily or if you used to smoke.  Skin cancer  Skin cancer screening is done by checking the skin for unusual moles or spots and any changes in existing moles. Your health care provider should check your skin for signs of skin cancer at every physical exam. You should check your skin every month and tell your health care provider right away if anything looks unusual. Women with a higher-than-normal risk for skin cancer may want to see a skin specialist (dermatologist) for an annual body check.  What are the benefits of screening?  Cancer screening is done to look for cancer in the very early stages, before it spreads and becomes harder to treat and before you would start to notice symptoms. Finding cancer early improves the chances of successful treatment. It may save your life.  Where to find more information  · American Cancer Society: www.cancer.org  · Centers for Disease Control and Prevention: www.cdc.gov  · National Cancer [place]: www.cancer.gov  · [place] Department of Health and Human Services: www.womenshealth.gov  Contact a health care provider if:  You have concerns about any signs or symptoms of cancer. These may include:  · Skin problems. You may have:  ? Moles of an unusual shape or color.  ? Changes in existing moles.  ? A sore on your skin that does not heal.  · Tiredness (fatigue) that does not go away.  · Losing weight without trying.  · Blood in your urine or stool.  · Problems with coughing or breathing. These may include:  ? Coughing or trouble breathing that does not go  "away.  ? Coughing up blood.  · Lumps or other changes in your breasts.  · Vaginal bleeding, spotting, or changes in your periods.  · Frequent pain or cramping in your abdomen.  Summary  · Your health care provider will recommend specific cancer screenings based on your age, medical history, and family history of cancer.  · Work with your health care provider to create a cancer screening schedule that protects your health.  · Finding cancer early improves the chances of successful treatment. It may save your life.  · Contact a health care provider if you have concerns about any signs or symptoms of cancer.  This information is not intended to replace advice given to you by your health care provider. Make sure you discuss any questions you have with your health care provider.  Document Revised: 11/13/2020 Document Reviewed: 11/13/2020  [employer] Patient Education © 2021 [employer]    http://APA.org/depression-guideline\"> https://SwitchNote.Odersun\"> http://point-of-care.BRAIN/skills/\"> http://point-of-care.BRAIN\">   Managing Depression, Adult  Depression is a mental health condition that affects your thoughts, feelings, and actions. Being diagnosed with depression can bring you relief if you did not know why you have felt or behaved a certain way. It could also leave you feeling overwhelmed with uncertainty about your future. Preparing yourself to manage your symptoms can help you feel more positive about your future.  How to manage lifestyle changes  Managing stress    Stress is your body's reaction to life changes and events, both good and bad. Stress can add to your feelings of depression. Learning to manage your stress can help lessen your feelings of depression.  Try some of the following approaches to reducing your stress (stress reduction techniques):  · Listen to music that you enjoy and that inspires you.  · Try using a meditation gab or take a meditation " class.  · Develop a practice that helps you connect with your spiritual self. Walk in nature, pray, or go to a place of Denominational.  · Do some deep breathing. To do this, inhale slowly through your nose. Pause at the top of your inhale for a few seconds and then exhale slowly, letting your muscles relax.  · Practice yoga to help relax and work your muscles.  Choose a stress reduction technique that suits your lifestyle and personality. These techniques take time and practice to develop. Set aside 5-15 minutes a day to do them. Therapists can offer training in these techniques. Other things you can do to manage stress include:  · Keeping a stress diary.  · Knowing your limits and saying no when you think something is too much.  · Paying attention to how you react to certain situations. You may not be able to control everything, but you can change your reaction.  · Adding humor to your life by watching funny films or TV shows.  · Making time for activities that you enjoy and that relax you.    Medicines  Medicines, such as antidepressants, are often a part of treatment for depression.  · Talk with your pharmacist or health care provider about all the medicines, supplements, and herbal products that you take, their possible side effects, and what medicines and other products are safe to take together.  · Make sure to report any side effects you may have to your health care provider.  Relationships  Your health care provider may suggest family therapy, couples therapy, or individual therapy as part of your treatment.  How to recognize changes  Everyone responds differently to treatment for depression. As you recover from depression, you may start to:  · Have more interest in doing activities.  · Feel less hopeless.  · Have more energy.  · Overeat less often, or have a better appetite.  · Have better mental focus.  It is important to recognize if your depression is not getting better or is getting worse. The symptoms you  had in the beginning may return, such as:  · Tiredness (fatigue) or low energy.  · Eating too much or too little.  · Sleeping too much or too little.  · Feeling restless, agitated, or hopeless.  · Trouble focusing or making decisions.  · Unexplained physical complaints.  · Feeling irritable, angry, or aggressive.  If you or your family members notice these symptoms coming back, let your health care provider know right away.  Follow these instructions at home:  Activity    · Try to get some form of exercise each day, such as walking, biking, swimming, or lifting weights.  · Practice stress reduction techniques.  · Engage your mind by taking a class or doing some volunteer work.  Lifestyle  · Get the right amount and quality of sleep.  · Cut down on using caffeine, tobacco, alcohol, and other potentially harmful substances.  · Eat a healthy diet that includes plenty of vegetables, fruits, whole grains, low-fat dairy products, and lean protein. Do not eat a lot of foods that are high in solid fats, added sugars, or salt (sodium).  General instructions  · Take over-the-counter and prescription medicines only as told by your health care provider.  · Keep all follow-up visits as told by your health care provider. This is important.  Where to find support  Talking to others    Friends and family members can be sources of support and guidance. Talk to trusted friends or family members about your condition. Explain your symptoms to them, and let them know that you are working with a health care provider to treat your depression. Tell friends and family members how they also can be helpful.  Finances  · Find appropriate mental health providers that fit with your financial situation.  · Talk with your health care provider about options to get reduced prices on your medicines.  Where to find more information  You can find support in your area from:  · Anxiety and Depression Association of [place] (ADAA): www.adaa.org  · Mental  Health [place]: www.mentalhealthamerica.net  · National [place] on Mental Illness: www.mikala.org  Contact a health care provider if:  · You stop taking your antidepressant medicines, and you have any of these symptoms:  ? Nausea.  ? Headache.  ? Light-headedness.  ? Chills and body aches.  ? Not being able to sleep (insomnia).  · You or your friends and family think your depression is getting worse.  Get help right away if:  · You have thoughts of hurting yourself or others.  If you ever feel like you may hurt yourself or others, or have thoughts about taking your own life, get help right away. Go to your nearest emergency department or:  · Call your local emergency services (811 in the [place]  · Call a suicide crisis helpline, such as the National Suicide Prevention Lifeline at 1-697.764.1407. This is open 24 hours a day in the [place]  · Text the Crisis Text Line at 700764 (in the [place]  Summary  · If you are diagnosed with depression, preparing yourself to manage your symptoms is a good way to feel positive about your future.  · Work with your health care provider on a management plan that includes stress reduction techniques, medicines (if applicable), therapy, and healthy lifestyle habits.  · Keep talking with your health care provider about how your treatment is working.  · If you have thoughts about taking your own life, call a suicide crisis helpline or text a crisis text line.  This information is not intended to replace advice given to you by your health care provider. Make sure you discuss any questions you have with your health care provider.  Document Revised: 10/28/2020 Document Reviewed: 10/28/2020  [employer]vier Patient Education © 2021 [employer]    Insomnia  Insomnia is a sleep disorder that makes it difficult to fall asleep or stay asleep. Insomnia can cause fatigue, low energy, difficulty concentrating, mood swings, and poor performance at work or school.  There are three different ways to classify  insomnia:  · Difficulty falling asleep.  · Difficulty staying asleep.  · Waking up too early in the morning.  Any type of insomnia can be long-term (chronic) or short-term (acute). Both are common. Short-term insomnia usually lasts for three months or less. Chronic insomnia occurs at least three times a week for longer than three months.  What are the causes?  Insomnia may be caused by another condition, situation, or substance, such as:  · Anxiety.  · Certain medicines.  · Gastroesophageal reflux disease (GERD) or other gastrointestinal conditions.  · Asthma or other breathing conditions.  · Restless legs syndrome, sleep apnea, or other sleep disorders.  · Chronic pain.  · Menopause.  · Stroke.  · Abuse of alcohol, tobacco, or illegal drugs.  · Mental health conditions, such as depression.  · Caffeine.  · Neurological disorders, such as Alzheimer's disease.  · An overactive thyroid (hyperthyroidism).  Sometimes, the cause of insomnia may not be known.  What increases the risk?  Risk factors for insomnia include:  · Gender. Women are affected more often than men.  · Age. Insomnia is more common as you get older.  · Stress.  · Lack of exercise.  · Irregular work schedule or working night shifts.  · Traveling between different time zones.  · Certain medical and mental health conditions.  What are the signs or symptoms?  If you have insomnia, the main symptom is having trouble falling asleep or having trouble staying asleep. This may lead to other symptoms, such as:  · Feeling fatigued or having low energy.  · Feeling nervous about going to sleep.  · Not feeling rested in the morning.  · Having trouble concentrating.  · Feeling irritable, anxious, or depressed.  How is this diagnosed?  This condition may be diagnosed based on:  · Your symptoms and medical history. Your health care provider may ask about:  ? Your sleep habits.  ? Any medical conditions you have.  ? Your mental health.  · A physical exam.  How is this  treated?  Treatment for insomnia depends on the cause. Treatment may focus on treating an underlying condition that is causing insomnia. Treatment may also include:  · Medicines to help you sleep.  · Counseling or therapy.  · Lifestyle adjustments to help you sleep better.  Follow these instructions at home:  Eating and drinking    · Limit or avoid alcohol, caffeinated beverages, and cigarettes, especially close to bedtime. These can disrupt your sleep.  · Do not eat a large meal or eat spicy foods right before bedtime. This can lead to digestive discomfort that can make it hard for you to sleep.  Sleep habits    · Keep a sleep diary to help you and your health care provider figure out what could be causing your insomnia. Write down:  ? When you sleep.  ? When you wake up during the night.  ? How well you sleep.  ? How rested you feel the next day.  ? Any side effects of medicines you are taking.  ? What you eat and drink.  · Make your bedroom a dark, comfortable place where it is easy to fall asleep.  ? Put up shades or blackout curtains to block light from outside.  ? Use a white noise machine to block noise.  ? Keep the temperature cool.  · Limit screen use before bedtime. This includes:  ? Watching TV.  ? Using your smartphone, tablet, or computer.  · Stick to a routine that includes going to bed and waking up at the same times every day and night. This can help you fall asleep faster. Consider making a quiet activity, such as reading, part of your nighttime routine.  · Try to avoid taking naps during the day so that you sleep better at night.  · Get out of bed if you are still awake after 15 minutes of trying to sleep. Keep the lights down, but try reading or doing a quiet activity. When you feel sleepy, go back to bed.  General instructions  · Take over-the-counter and prescription medicines only as told by your health care provider.  · Exercise regularly, as told by your health care provider. Avoid exercise  starting several hours before bedtime.  · Use relaxation techniques to manage stress. Ask your health care provider to suggest some techniques that may work well for you. These may include:  ? Breathing exercises.  ? Routines to release muscle tension.  ? Visualizing peaceful scenes.  · Make sure that you drive carefully. Avoid driving if you feel very sleepy.  · Keep all follow-up visits as told by your health care provider. This is important.  Contact a health care provider if:  · You are tired throughout the day.  · You have trouble in your daily routine due to sleepiness.  · You continue to have sleep problems, or your sleep problems get worse.  Get help right away if:  · You have serious thoughts about hurting yourself or someone else.  If you ever feel like you may hurt yourself or others, or have thoughts about taking your own life, get help right away. You can go to your nearest emergency department or call:  · Your local emergency services (911 in the [place]  · A suicide crisis helpline, such as the National Suicide Prevention Lifeline at 1-744.126.1761. This is open 24 hours a day.  Summary  · Insomnia is a sleep disorder that makes it difficult to fall asleep or stay asleep.  · Insomnia can be long-term (chronic) or short-term (acute).  · Treatment for insomnia depends on the cause. Treatment may focus on treating an underlying condition that is causing insomnia.  · Keep a sleep diary to help you and your health care provider figure out what could be causing your insomnia.  This information is not intended to replace advice given to you by your health care provider. Make sure you discuss any questions you have with your health care provider.  Document Revised: 11/30/2018 Document Reviewed: 09/27/2018  Elsevier Patient Education © 2021 [employer]

## 2021-08-02 NOTE — PROGRESS NOTES
Patient Care Team:  Ciara Linares APRN as PCP - General (Nurse Practitioner)     Chief Complaint   Patient presents with   • Annual Exam     pt is getting a physical with pap   • Med Refill     Pt states she is supposed to take Fluoxetine 20 mg tid but rx states 20 mg one time daily     Chief complaint: Patient is in today for a physical     Patient is a 50 y.o. female who presents for her yearly physical exam.     HPI   patient is here for annual physical, having numbness and tingling in left arm only when she touches it between the elbow and wrist, states she had a nerve conduction study last year at a chiropractor office, never got results  Sees neuro for RLS  Sees therapist twice a month for depression and anxiety  Review of Systems   Constitutional: Negative for appetite change, chills, diaphoresis, fatigue, fever and unexpected weight change.   HENT: Negative for congestion, ear pain, hearing loss and trouble swallowing.    Eyes: Positive for visual disturbance (occ blurry). Negative for photophobia, pain and redness.   Respiratory: Positive for apnea (cpap), shortness of breath and wheezing. Negative for cough.    Cardiovascular: Positive for leg swelling. Negative for chest pain and palpitations.   Gastrointestinal: Negative for abdominal distention, abdominal pain, blood in stool, constipation, diarrhea, nausea and vomiting.   Genitourinary: Negative for difficulty urinating, dysuria and vaginal bleeding (last period was 2019).   Musculoskeletal: Positive for arthralgias, back pain, neck pain and neck stiffness.   Skin: Negative for color change, rash and wound.   Neurological: Positive for dizziness (occ), numbness (hands) and headaches (0-3 times a month).   Psychiatric/Behavioral: Positive for dysphoric mood (in therapy) and sleep disturbance. Negative for self-injury and suicidal ideas. The patient is nervous/anxious (controlled).       History  Past Medical History:   Diagnosis Date   • Alcohol  "Referring Physician: Britni Presley   CC:   Chief Complaint   Patient presents with     Consult     New patient here for mole on scalp. Would like removed      HPI:   We had the pleasure of seeing Teresa in our Pediatric Dermatology clinic today, in consultation from Britni Presley for evaluation of a mole on her scalp. She is here with her father today.  For as long as she can remember, has had a mole on the top of her scalp. Dad is not sure if it has been present since birth.  In recent years it has continued to grow.  It now gets irritated frequently, will stick through her hair, and is traumatized with brushing.  She is hoping to have it removed.  It does not bleed unless traumatized.  She has no other scalp symptoms.  Patient also has acne.  She has never sought medical treatment for this before and uses an over-the-counter facial scrub.  She is otherwise at her base line health with no additional skin concerns today.  Past Medical/Surgical History: Reviewed.  Family History: Father had acne during teenage years.  Social History: Lives with mom & dad. Is in 8th grade.   Medications:   Current Outpatient Prescriptions   Medication Sig Dispense Refill     Calcium-Phosphorus-Vitamin D (CALCIUM GUMMIES PO)        MELATONIN PO Take by mouth At Bedtime        Allergies: No Known Allergies   ROS: a 10 point review of systems including constitutional, HEENT, CV, GI, musculoskeletal, Neurologic, Endocrine, Respiratory, Hematologic and Allergic/Immunologic was performed and was negative.  Physical examination: /78 (BP Location: Left arm, Patient Position: Sitting, Cuff Size: Adult Regular)  Pulse 107  Ht 5' 2.01\" (157.5 cm)  Wt 126 lb 15.8 oz (57.6 kg)  BMI 23.22 kg/m2   General: Well-developed, well-nourished in no apparent distress. Normal mood and affect Eyelids and conjunctivae normal.  \ Patient was breathing comfortably on room air. Extremities were warm and well-perfused without edema. " There was no clubbing or cyanosis, nails normal.    Skin: A complete skin examination and palpation of skin and subcutaneous tissues of the scalp, eyebrows, face, chest, back, abdomen, groin and upper and lower extremities was performed and notable for:  - Juan II  - Hair is greasy with loose greasy scale. On superior scalp is an exophytic, slightly pedunculated, pink papule with superficial telangiectasias.  - Scattered over face (forehead, cheek, nose & chin) are numerous open and closed comedones as well as small inflammatory papules and few pustules  - Upper back with few scattered small inflammatory papules and pustules      In office labs or procedures performed today:   None  Assessment/Plan:  1. Symptomatic nevus, superior scalp. Given location and exophytic growth pattern, continually traumatized with hair brushing and warrants removal. Discussed options with the patient and her father today including punch vs shave removal and potential risks/benefits of both. They would like to proceed with removal, which will be scheduled during procedure slot over the summer.   2. Acne vulgaris, moderate, comedonal and inflammatory.  We discussed the natural history and treatment options for comedonal and mild inflammatory acne today. Provided handout with recommendations for skin care in the setting of acne. No evidence of permanent scarring today.  Will start Teresa on topical regimen as below. Side effects of topical therapies including irritaion and dryness were discussed.      AM: OTC Benzoyl peroxide wash, followed by moisturizer with sunscreen.     PM: Gentle facial , followed by tretinoin 0.025% cream to apply at bedtime, starting every other night and increasing to nightly as tolerated.     Follow-up in 8/3/18 for mole removal.   Thank you for allowing us to participate in Teresa's care.  Patient was seen and discussed with Dr. Callejas.   Lang Spear MD  Dermatology Resident, PGY3    CC:   abuse     sober since 2003   • Allergic    • Anxiety    • Arthritis     LEFT SHOULDER WITH BONE SPURS   • Asthma     AS A CHILD   • Bunion of right foot    • CHF (congestive heart failure) (CMS/[facility]) 2019   • Depression    • Diabetes mellitus (CMS/[facility])     PREDIABETIC   • GERD (gastroesophageal reflux disease)    • Hyperlipemia    • Low back pain 2003   • Lower back injury    • Lumbar herniated disc    • Menopause     last period  2019   • Migraines    • No natural teeth    • Sleep apnea     CPAP   • Spine pain    • Tattoo     X4   • Wears glasses       Past Surgical History:   Procedure Laterality Date   • BUNIONECTOMY Right 12/4/2017    Procedure: Right foot bunionectomy with osteotomy, double osteotomy with Rui osteotomy;  Surgeon: Reno Mccullough DPM;  Location: [facility];  Service:    • MULTIPLE TOOTH EXTRACTIONS     • TUBAL ABDOMINAL LIGATION        Allergies   Allergen Reactions   • Methadone GI Intolerance   • Aspirin Rash      Family History   Problem Relation Age of Onset   • Obesity Mother    • Heart disease Father    • Hyperlipidemia Father    • Liver disease Father    • Thyroid disease Father    • Hypertension Father    • Kidney disease Father    • Stroke Father    • Heart attack Father    • Arthritis Father    • Anxiety disorder Father    • Asthma Father    • Depression Father    • Diabetes Sister    • Diabetes Maternal Grandmother    • Lung cancer Maternal Grandmother      Social History     Socioeconomic History   • Marital status:      Spouse name: Not on file   • Number of children: Not on file   • Years of education: Not on file   • Highest education level: Not on file   Tobacco Use   • Smoking status: Current Every Day Smoker     Packs/day: 0.50     Years: 34.00     Pack years: 17.00     Types: Cigarettes     Start date: 7/19/1983   • Smokeless tobacco: Never Used   Substance and Sexual Activity   • Alcohol use: Not Currently     Alcohol/week: 0.0 standard drinks     Comment: quit in 2003    • Drug use: No     Types: Marijuana   • Sexual activity: Not Currently     Partners: Male     Birth control/protection: Surgical        Current Outpatient Medications:   •  albuterol sulfate  (90 Base) MCG/ACT inhaler, Inhale 2 puffs Every 4 (Four) Hours As Needed for Wheezing or Shortness of Air., Disp: 18 g, Rfl: 0  •  atorvastatin (LIPITOR) 80 MG tablet, Take 1 tablet by mouth Every Night., Disp: 90 tablet, Rfl: 0  •  budesonide-formoterol (SYMBICORT) 160-4.5 MCG/ACT inhaler, Inhale 2 puffs 2 (Two) Times a Day., Disp: 10.2 g, Rfl: 3  •  busPIRone (BUSPAR) 7.5 MG tablet, Take 1 tablet by mouth 3 (Three) Times a Day., Disp: 270 tablet, Rfl: 1  •  cetirizine (zyrTEC) 10 MG tablet, Take 1 tablet by mouth Daily., Disp: 90 tablet, Rfl: 1  •  cholecalciferol (VITAMIN D3) 25 MCG (1000 UT) tablet, Take 1,000 Units by mouth Daily., Disp: , Rfl:   •  FLUoxetine (PROzac) 20 MG capsule, Take 1 capsule by mouth 3 (Three) Times a Day., Disp: 270 capsule, Rfl: 1  •  nicotine (Nicotine Step 2) 14 MG/24HR patch, Place 1 patch on the skin as directed by provider Daily., Disp: 30 each, Rfl: 1  •  rOPINIRole (REQUIP) 1 MG tablet, Take 2 tablets by mouth Every Night. Take 1 hour before bedtime., Disp: 60 tablet, Rfl: 0  •  SUMAtriptan (IMITREX) 100 MG tablet, TAKE ONE TABLET BY MOUTH AT ONSET OF HEADACHE; MAY REPEAT ONE TABLET IN 2 HOURS IF NEEDED., Disp: 9 tablet, Rfl: 5  •  topiramate (TOPAMAX) 200 MG tablet, Take 1 tablet by mouth Every Night., Disp: 90 tablet, Rfl: 1  •  traZODone (DESYREL) 100 MG tablet, Take 1 tablet by mouth Every Night., Disp: 90 tablet, Rfl: 1  •  triamcinolone (KENALOG) 0.5 % cream, , Disp: , Rfl:     Immunizations   N/A   Prescribed/Refused   Date     Td/Tdap  (Booster Q 10 yrs)   []           Prescribed    []     Refused        []           Flu  (Yearly)   []        Prescribed    []     Refused        []           Pneumovax  (1 yr after Prevnar)   []        Prescribed    []     Refused        []    Britni Presley, [facility]  I have personally examined this patient and agree with the resident's documentation and plan of care.  I have reviewed and amended the resident's note above.  The documentation accurately reflects my clinical observations, diagnoses, treatment and follow-up plans.     Steven Callejas MD  , Pediatric Dermatology             Prevnar 13  (1 yr after Pneumo)   []        Prescribed    []     Refused        []           Hep B     []        Prescribed    []     Refused        []           Shingles  (Age 50 and older)   []        Prescribed    []     Refused        []           Immunization History   Administered Date(s) Administered   • COVID-19 (BRIGITTE) 05/11/2021   • Hepatitis A 01/17/2019     Health Maintenance   Topic Date Due   • URINE MICROALBUMIN  Never done   • COLORECTAL CANCER SCREENING  Never done   • ANNUAL PHYSICAL  Never done   • Pneumococcal Vaccine 0-64 (1 of 2 - PPSV23) Never done   • Hepatitis B (1 of 3 - Risk 3-dose series) Never done   • DIABETIC FOOT EXAM  Never done   • PAP SMEAR  05/24/2020   • MAMMOGRAM  Never done   • DIABETIC EYE EXAM  08/24/2021   • INFLUENZA VACCINE  10/01/2021   • HEMOGLOBIN A1C  01/19/2022   • LIPID PANEL  07/19/2022   • TDAP/TD VACCINES (3 - Td or Tdap) 05/24/2027   • HEPATITIS C SCREENING  Completed   • COVID-19 Vaccine  Completed        Diabetes  [] Yes  [x] No   N/A      Date     Eye Exam     []             []   Complete     []   Recommended Date:  Where:       Foot Exam     []         []   Complete          Obesity Counseling     []       []   Complete       Hemoglobin A1C   Date Value Ref Range Status   07/19/2021 6.00 (H) 4.80 - 5.60 % Final       Additional Testing      Date     Colorectal Screening       []   N/A   []   Complete    []   Ordered     Date:    Where:       Pap      []   N/A   [x]   Complete   []   OB/GYN Date:    Where:       Mammogram        []   N/A   []   Complete   []  OB/GYN   [x]   Ordered Date:    Where:     PSA  (Over age 50)    []   N/A   []   Complete   []   Ordered Date:    Where:     US Aorta  (For male smokers, age 65)     []   N/A   []   Complete   []   Ordered Date:    Where:     CT for Smoker  (Age 55-75, 30 pk yr)    []   N/A   []   Complete   []   Ordered Date:    Where:     Bone Density/DEXA      []   N/A   []   Complete   []   Ordered  Date:    Follow-up:     Hep. C        []   N/A   []   Complete   []   Ordered Date:    Where:     Results for orders placed or performed in visit on 07/19/21   CBC Auto Differential    Specimen: Blood   Result Value Ref Range    WBC 8.82 3.40 - 10.80 10*3/mm3    RBC 4.71 3.77 - 5.28 10*6/mm3    Hemoglobin 13.0 12.0 - 15.9 g/dL    Hematocrit 40.2 34.0 - 46.6 %    MCV 85.4 79.0 - 97.0 fL    MCH 27.6 26.6 - 33.0 pg    MCHC 32.3 31.5 - 35.7 g/dL    RDW 15.0 12.3 - 15.4 %    RDW-SD 46.6 37.0 - 54.0 fl    MPV 10.4 6.0 - 12.0 fL    Platelets 267 140 - 450 10*3/mm3    Neutrophil % 68.8 42.7 - 76.0 %    Lymphocyte % 19.8 19.6 - 45.3 %    Monocyte % 7.3 5.0 - 12.0 %    Eosinophil % 1.7 0.3 - 6.2 %    Basophil % 0.7 0.0 - 1.5 %    Immature Grans % 1.7 (H) 0.0 - 0.5 %    Neutrophils, Absolute 6.07 1.70 - 7.00 10*3/mm3    Lymphocytes, Absolute 1.75 0.70 - 3.10 10*3/mm3    Monocytes, Absolute 0.64 0.10 - 0.90 10*3/mm3    Eosinophils, Absolute 0.15 0.00 - 0.40 10*3/mm3    Basophils, Absolute 0.06 0.00 - 0.20 10*3/mm3    Immature Grans, Absolute 0.15 (H) 0.00 - 0.05 10*3/mm3    nRBC 0.0 0.0 - 0.2 /100 WBC   Comprehensive Metabolic Panel    Specimen: Blood   Result Value Ref Range    Glucose 144 (H) 65 - 99 mg/dL    BUN 13 6 - 20 mg/dL    Creatinine 0.92 0.57 - 1.00 mg/dL    Sodium 139 136 - 145 mmol/L    Potassium 4.2 3.5 - 5.2 mmol/L    Chloride 104 98 - 107 mmol/L    CO2 23.8 22.0 - 29.0 mmol/L    Calcium 9.3 8.6 - 10.5 mg/dL    Total Protein 7.5 6.0 - 8.5 g/dL    Albumin 4.50 3.50 - 5.20 g/dL    ALT (SGPT) 20 1 - 33 U/L    AST (SGOT) 24 1 - 32 U/L    Alkaline Phosphatase 91 39 - 117 U/L    Total Bilirubin 0.3 0.0 - 1.2 mg/dL    eGFR Non African Amer 65 >60 mL/min/1.73    Globulin 3.0 gm/dL    A/G Ratio 1.5 g/dL    BUN/Creatinine Ratio 14.1 7.0 - 25.0    Anion Gap 11.2 5.0 - 15.0 mmol/L   Hemoglobin A1c    Specimen: Blood   Result Value Ref Range    Hemoglobin A1C 6.00 (H) 4.80 - 5.60 %   Lipid Panel    Specimen: Blood   Result  "Value Ref Range    Total Cholesterol 241 (H) 0 - 200 mg/dL    Triglycerides 600 (H) 0 - 150 mg/dL    HDL Cholesterol 33 (L) 40 - 60 mg/dL    LDL Cholesterol  105 (H) 0 - 100 mg/dL    VLDL Cholesterol 103 (H) 5 - 40 mg/dL    LDL/HDL Ratio 2.67    Vitamin D 25 Hydroxy    Specimen: Blood   Result Value Ref Range    25 Hydroxy, Vitamin D 22.5 (L) 30.0 - 100.0 ng/ml   Hepatitis C Antibody    Specimen: Blood   Result Value Ref Range    Hepatitis C Ab Non-Reactive Non-Reactive   proBNP    Specimen: Blood   Result Value Ref Range    proBNP 24.3 0.0 - 450.0 pg/mL            /82   Pulse 100   Ht 167 cm (65.75\")   Wt 87.8 kg (193 lb 9.6 oz)   SpO2 99%   BMI 31.49 kg/m²       Physical Exam  Vitals and nursing note reviewed.   Constitutional:       General: She is not in acute distress.     Appearance: Normal appearance. She is well-developed. She is not diaphoretic.   HENT:      Head: Normocephalic and atraumatic.      Right Ear: Tympanic membrane and external ear normal.      Left Ear: Tympanic membrane and external ear normal.      Mouth/Throat:      Pharynx: No oropharyngeal exudate.   Eyes:      General: No scleral icterus.        Right eye: No discharge.         Left eye: No discharge.      Extraocular Movements: Extraocular movements intact.      Conjunctiva/sclera: Conjunctivae normal.   Neck:      Thyroid: No thyromegaly.      Vascular: No carotid bruit or JVD (no bruits).      Trachea: No tracheal deviation.   Cardiovascular:      Rate and Rhythm: Normal rate and regular rhythm.      Heart sounds: Normal heart sounds. No murmur heard.   No friction rub.   Pulmonary:      Effort: Pulmonary effort is normal. No respiratory distress.      Breath sounds: Normal breath sounds. No wheezing or rales.   Chest:      Chest wall: No tenderness.   Abdominal:      General: Bowel sounds are normal. There is no distension.      Palpations: Abdomen is soft. There is no mass.      Tenderness: There is no abdominal " tenderness.      Hernia: No hernia is present.   Genitourinary:     Comments: External genitalia without erythema, masses, exudate or discharge. Vaginal vault is without discharge. Cervix is of normal color without lesion noted.There is no bleeding noted. Uterus is noted to be of normal size and nontender. No cervical motion tenderness is seen. No masses are palpated. The adnexa are without masses or tenderness.  Musculoskeletal:         General: No tenderness or deformity.      Cervical back: Normal range of motion and neck supple.      Right lower leg: No edema.      Left lower leg: No edema.      Comments: Positive left tinel's and Phalen's sign;   Lymphadenopathy:      Cervical: No cervical adenopathy.   Skin:     General: Skin is warm and dry.      Coloration: Skin is not pale.      Findings: No erythema or rash.   Neurological:      Mental Status: She is alert and oriented to person, place, and time.      Deep Tendon Reflexes: Reflexes are normal and symmetric. Reflexes normal.   Psychiatric:         Behavior: Behavior normal.         Thought Content: Thought content normal.             Counseling provided on diet and nutrition, exercise and weight management.    Diagnoses and all orders for this visit:    Annual physical exam    Severe episode of recurrent major depressive disorder, without psychotic features (CMS/HCC)  -     traZODone (DESYREL) 100 MG tablet; Take 1 tablet by mouth Every Night.  -     FLUoxetine (PROzac) 20 MG capsule; Take 1 capsule by mouth 3 (Three) Times a Day.    Primary insomnia  -     traZODone (DESYREL) 100 MG tablet; Take 1 tablet by mouth Every Night.    Encounter for screening mammogram for malignant neoplasm of breast  -     Mammo Screening Digital Tomosynthesis Bilateral With CAD; Future    Cervical cancer screening  -     Liquid-based Pap Smear, Screening; Future    Numbness and tingling sensation of skin  -     EMG & Nerve Conduction Test; Future    Numbness of fingers  -      EMG & Nerve Conduction Test; Future    Prediabetes       Screening labs done earlier in July, patient provided diet information to reduce risk of developing diabetes, A1c 6.0  refilled trazodone and prozac, continue seeing therapist for mental health issues  Nerve conduction of DEVIN ordered, advised wrist brace at night. S/s consistent with carpal tunnel  Nutrition and activity goals reviewed including: mainly water to drink, limit white flour/processed sugar, and processed foods, choose fresh fruits, vegetables, fish, lean meats,high fiber carbs, exercise  working toward 150 mins cardio per week, weight training 2x/week.Adequate calcium, vitamin D and weight bearing exercise such as walking are important to reduce the risk of osteoporosis.  Declines colonoscopy order at this time    KWASI Mota   8/4/2021   06:45 EDT          Please note that portions of this document were completed with a voice recognition program.

## 2021-08-04 PROBLEM — R73.03 PREDIABETES: Status: ACTIVE | Noted: 2021-08-04

## 2021-08-04 PROBLEM — R20.2 NUMBNESS AND TINGLING SENSATION OF SKIN: Status: ACTIVE | Noted: 2021-08-04

## 2021-08-04 PROBLEM — F51.01 PRIMARY INSOMNIA: Status: ACTIVE | Noted: 2021-08-04

## 2021-08-04 PROBLEM — R20.0 NUMBNESS AND TINGLING SENSATION OF SKIN: Status: ACTIVE | Noted: 2021-08-04

## 2021-08-31 ENCOUNTER — CLINICAL SUPPORT NO REQUIREMENTS (OUTPATIENT)
Dept: PULMONOLOGY | Facility: CLINIC | Age: 51
End: 2021-08-31

## 2021-08-31 DIAGNOSIS — Z01.812 BLOOD TESTS PRIOR TO TREATMENT OR PROCEDURE: Primary | ICD-10-CM

## 2021-08-31 PROCEDURE — U0004 COV-19 TEST NON-CDC HGH THRU: HCPCS | Performed by: INTERNAL MEDICINE

## 2021-08-31 PROCEDURE — 99211 OFF/OP EST MAY X REQ PHY/QHP: CPT | Performed by: INTERNAL MEDICINE

## 2021-09-01 LAB — SARS-COV-2 RNA NOSE QL NAA+PROBE: NOT DETECTED

## 2021-09-02 ENCOUNTER — TELEPHONE (OUTPATIENT)
Dept: PULMONOLOGY | Facility: CLINIC | Age: 51
End: 2021-09-02

## 2021-09-02 ENCOUNTER — OFFICE VISIT (OUTPATIENT)
Dept: PULMONOLOGY | Facility: CLINIC | Age: 51
End: 2021-09-02

## 2021-09-02 VITALS
OXYGEN SATURATION: 97 % | SYSTOLIC BLOOD PRESSURE: 118 MMHG | HEIGHT: 66 IN | TEMPERATURE: 97.5 F | BODY MASS INDEX: 30.37 KG/M2 | WEIGHT: 189 LBS | DIASTOLIC BLOOD PRESSURE: 80 MMHG | HEART RATE: 78 BPM

## 2021-09-02 DIAGNOSIS — R06.09 DOE (DYSPNEA ON EXERTION): ICD-10-CM

## 2021-09-02 DIAGNOSIS — J44.9 CHRONIC ASTHMATIC BRONCHITIS (HCC): ICD-10-CM

## 2021-09-02 DIAGNOSIS — R06.02 SHORTNESS OF BREATH: Primary | ICD-10-CM

## 2021-09-02 DIAGNOSIS — J45.20 MILD INTERMITTENT ASTHMA WITHOUT COMPLICATION: Primary | ICD-10-CM

## 2021-09-02 DIAGNOSIS — J41.1 MUCOPURULENT CHRONIC BRONCHITIS (HCC): ICD-10-CM

## 2021-09-02 PROCEDURE — 94375 RESPIRATORY FLOW VOLUME LOOP: CPT | Performed by: INTERNAL MEDICINE

## 2021-09-02 PROCEDURE — 99204 OFFICE O/P NEW MOD 45 MIN: CPT | Performed by: INTERNAL MEDICINE

## 2021-09-02 PROCEDURE — 94726 PLETHYSMOGRAPHY LUNG VOLUMES: CPT | Performed by: INTERNAL MEDICINE

## 2021-09-02 PROCEDURE — 94729 DIFFUSING CAPACITY: CPT | Performed by: INTERNAL MEDICINE

## 2021-09-02 NOTE — PROGRESS NOTES
Shruthi Boone is a 51 y.o. female here for evaluation of   Chief Complaint   Patient presents with   • Asthma   • Shortness of Breath       Problem list:  1. Dyspnea on exertion  2. Chronic mucopurulent bronchitis  3. Chronic asthma  4. Tobacco abuse  5. Obstructive sleep apnea  6. Allergic rhinitis  7. Deviated nasal septum  8. Dyslipidemia  9. Migraine headaches  10. GERD  11. Low back pain, lumbar disc disease, previous injury  12. Depression/anxiety  13. Osteoarthritis  14. Hemoglobin A1c 6  15. Childbirth x1  16. Previous alcohol abuse, resolved 2003  17. Right bunionectomy  18. Tubal ligation  19. Dental extraction  20. Drug allergies methadone, aspirin    History of Present Illness    51-year-old woman, lifelong smoker with a history of lifelong asthma referred for evaluation.  When I asked the patient why she is here she said she did not know her DrSaira sent her.  She started smoking at age 13 years and was smoking 3 packs/day until 6 months ago.  At that time she put on a nicotine patch and cut her consumption to 4 or 5 cigarettes/day.  She is currently on Symbicort 2 puffs twice daily and has a rescue inhaler.  She admits to a chronic cough productive of yellow mucoid secretions daily.  She also notes wheezing on a daily basis.  She will use her rescue inhaler 6-8 times per day.  When she walks she will become winded around a half a block and no chest tightness and wheezing.  She does not always get relief from her albuterol inhaler.  She has a history of chest pain maybe once a month that is sharp in nature and she thinks it stress related.  However upon further questioning she does at times get chest tightness with exertion.  Its not consistent and does not radiate.  She does not have a known history of heart disease but tells me she was hospitalized in [place] in 2019 with congestive heart failure.  She will also frequently wake up at night wheezing.  She was diagnosed with sleep apnea in 2016 and wears  a CPAP machine.        Review of Systems   Constitutional: Positive for appetite change, diaphoresis and unexpected weight change.   HENT: Positive for postnasal drip and sinus pressure.    Eyes: Positive for photophobia and itching.   Respiratory: Positive for apnea, shortness of breath and wheezing.    Cardiovascular: Positive for chest pain and leg swelling.   Gastrointestinal: Negative.    Endocrine: Positive for cold intolerance, heat intolerance and polyuria.   Genitourinary: Negative.    Musculoskeletal: Positive for arthralgias, back pain, myalgias, neck pain and neck stiffness.   Skin: Negative.    Allergic/Immunologic: Positive for environmental allergies and food allergies.   Neurological: Positive for dizziness, weakness, light-headedness, numbness and headaches.   Psychiatric/Behavioral: Positive for decreased concentration, dysphoric mood and sleep disturbance. The patient is nervous/anxious.          Current Outpatient Medications:   •  albuterol sulfate  (90 Base) MCG/ACT inhaler, Inhale 2 puffs Every 4 (Four) Hours As Needed for Wheezing or Shortness of Air., Disp: 18 g, Rfl: 0  •  atorvastatin (LIPITOR) 80 MG tablet, Take 1 tablet by mouth Every Night., Disp: 90 tablet, Rfl: 0  •  budesonide-formoterol (SYMBICORT) 160-4.5 MCG/ACT inhaler, Inhale 2 puffs 2 (Two) Times a Day., Disp: 10.2 g, Rfl: 3  •  busPIRone (BUSPAR) 7.5 MG tablet, Take 1 tablet by mouth 3 (Three) Times a Day., Disp: 270 tablet, Rfl: 1  •  cetirizine (zyrTEC) 10 MG tablet, Take 1 tablet by mouth Daily., Disp: 90 tablet, Rfl: 1  •  cholecalciferol (VITAMIN D3) 25 MCG (1000 UT) tablet, Take 1,000 Units by mouth Daily., Disp: , Rfl:   •  FLUoxetine (PROzac) 20 MG capsule, Take 1 capsule by mouth 3 (Three) Times a Day., Disp: 270 capsule, Rfl: 1  •  nicotine (Nicotine Step 2) 14 MG/24HR patch, Place 1 patch on the skin as directed by provider Daily., Disp: 30 each, Rfl: 1  •  rOPINIRole (REQUIP) 1 MG tablet, Take 2 tablets by  mouth Every Night. Take 1 hour before bedtime., Disp: 60 tablet, Rfl: 0  •  SUMAtriptan (IMITREX) 100 MG tablet, TAKE ONE TABLET BY MOUTH AT ONSET OF HEADACHE; MAY REPEAT ONE TABLET IN 2 HOURS IF NEEDED., Disp: 9 tablet, Rfl: 5  •  topiramate (TOPAMAX) 200 MG tablet, Take 1 tablet by mouth Every Night., Disp: 90 tablet, Rfl: 1  •  traZODone (DESYREL) 100 MG tablet, Take 1 tablet by mouth Every Night., Disp: 90 tablet, Rfl: 1  •  triamcinolone (KENALOG) 0.5 % cream, , Disp: , Rfl:     Past Medical History:   Diagnosis Date   • Alcohol abuse     sober since 2003   • Allergic    • Anxiety    • Arthritis     LEFT SHOULDER WITH BONE SPURS   • Asthma     AS A CHILD   • Bunion of right foot    • CHF (congestive heart failure) (CMS/[facility]) 2019   • Depression    • Diabetes mellitus (CMS/[facility])     PREDIABETIC   • GERD (gastroesophageal reflux disease)    • Hyperlipemia    • Hypertension    • Low back pain 2003   • Lower back injury    • Lumbar herniated disc    • Menopause     last period  2019   • Migraines    • No natural teeth    • Ovarian cyst    • Sleep apnea     CPAP   • Spine pain    • Tattoo     X4   • Wears glasses      Past Surgical History:   Procedure Laterality Date   • BUNIONECTOMY Right 12/4/2017    Procedure: Right foot bunionectomy with osteotomy, double osteotomy with Rui osteotomy;  Surgeon: Reno Mccullough DPM;  Location: [facility];  Service:    • MULTIPLE TOOTH EXTRACTIONS     • TUBAL ABDOMINAL LIGATION       Social History     Socioeconomic History   • Marital status:      Spouse name: Not on file   • Number of children: 1   • Years of education: Not on file   • Highest education level: Not on file   Tobacco Use   • Smoking status: Current Every Day Smoker     Packs/day: 3.00     Years: 34.00     Pack years: 102.00     Types: Cigarettes     Start date: 7/19/1983   • Smokeless tobacco: Never Used   • Tobacco comment: decreased to 5-6 cig per day 2/21   Substance and Sexual Activity   • Alcohol use:  "Not Currently     Alcohol/week: 0.0 standard drinks     Comment: quit in 2003   • Drug use: No     Types: Marijuana   • Sexual activity: Not Currently     Partners: Male     Birth control/protection: Surgical     Family History   Problem Relation Age of Onset   • Obesity Mother    • Heart disease Father    • Hyperlipidemia Father    • Liver disease Father    • Thyroid disease Father    • Hypertension Father    • Kidney disease Father    • Stroke Father    • Heart attack Father    • Arthritis Father    • Anxiety disorder Father    • Asthma Father    • Depression Father    • Diabetes Sister    • Diabetes Maternal Grandmother    • Lung cancer Maternal Grandmother      Blood pressure 118/80, pulse 78, temperature 97.5 °F (36.4 °C), height 167 cm (65.75\"), weight 85.7 kg (189 lb), SpO2 97 %.    Physical Exam  HENT:      Head: Normocephalic and atraumatic.      Nose: Congestion present.      Comments: Deviated septum to left     Mouth/Throat:      Mouth: Mucous membranes are moist.      Pharynx: Oropharynx is clear.   Eyes:      General: No scleral icterus.     Conjunctiva/sclera: Conjunctivae normal.      Pupils: Pupils are equal, round, and reactive to light.   Cardiovascular:      Rate and Rhythm: Normal rate and regular rhythm.      Heart sounds: Normal heart sounds. No murmur heard.     Pulmonary:      Effort: Pulmonary effort is normal.      Breath sounds: No wheezing.      Comments: Prolonged expiration but no wheeze, rhonchi  Abdominal:      General: Abdomen is flat. Bowel sounds are normal. There is no distension.      Palpations: Abdomen is soft.   Musculoskeletal:      Cervical back: Neck supple.      Right lower leg: No edema.      Left lower leg: No edema.   Lymphadenopathy:      Cervical: No cervical adenopathy.   Skin:     General: Skin is warm and dry.   Neurological:      Mental Status: She is alert and oriented to person, place, and time.   Psychiatric:      Comments: On her phone most of the visit. " Would answer questions but not engage with eye contact           PFTs:  September 2, 2021, FVC 3.37 L, 92%, FEV1 2.58 L, 89% ratio 76%, total lung capacity 5 L, 97%, diffusion capacity 14.9, 61%, diffusion over alveolar volume 84%, no obstruction or restriction, moderate diffusion impairment    Radiology:  WBC 8.82, Hg 13,  68%polys, 20% lymphs, 1.7% eos abs eos 150.   Lab:    Diagnoses and all orders for this visit:    1. Shortness of breath (Primary)  -     XR Chest PA & Lateral    2. Mucopurulent chronic bronchitis (CMS/HCC)    3. Chronic asthmatic bronchitis (CMS/HCC)    4. MENDENHALL (dyspnea on exertion)        Discussion:     #1  Chronic bronchitis she has a lifelong history of tobacco abuse with chronic mucopurulent secretions.  She has not required steroids but is on Symbicort and albuterol.  She does not use a nebulizer.  She has not required hospitalization.    #2 chronic asthma, lifelong, currently does not have wheezing on examination but has frequent daily symptoms.  Unfortunately she continues to actively smoke.  She is on inhaled corticosteroid long-acting bronchodilator.  She is not on an anticholinergic which might be helpful with her bronchitis.  Her FEV1 is normal and she is not wheezing on examination today in the office.  She has not required ER visits hospitalizations or steroids.    #3 dyspnea on exertion I cannot explain her severe exertional dyspnea solely based on her lung disease as her PFTs are near normal.  She does have a moderate reduction in her diffusion capacity but it does correct for alveolar volume indicating that she did not take a deep enough breath or hold it long enough.  She has a strong family history of heart disease, has dyslipidemia.  She has some atypical chest pain or tightness with exertion.     #4 obstructive sleep apnea she is compliant with her CPAP.  This was prescribed by another physician and I do not have her study.  She does appear rested.    -Obtain a copy  of her sleep study done in [place]  -Continue her current CPAP settings, I would like to find out what they are  -Add Spiriva Respimat 2 puffs daily  -Continue Symbicort 160-4.52 puffs twice daily  -Continue rescue inhaler  -I would consider adding a nebulizer if this is unaffected  -We then did discuss smoking cessation and the risk of nicotine poisoning with smoking and wearing a Pap  -Echocardiogram  -Cardiac stress test  -Follow-up with APRN after cardiac evaluation  -I considered a CTA of the chest but her resting saturation is excellent, she does not have a resting tachycardia and she is not having pleurisy or hemoptysis        Angela Cox MD

## 2021-09-03 DIAGNOSIS — R06.09 DOE (DYSPNEA ON EXERTION): Primary | ICD-10-CM

## 2021-09-10 ENCOUNTER — HOSPITAL ENCOUNTER (OUTPATIENT)
Dept: NEUROLOGY | Facility: HOSPITAL | Age: 51
Discharge: HOME | End: 2021-09-10

## 2021-09-10 ENCOUNTER — HOSPITAL ENCOUNTER (OUTPATIENT)
Dept: MAMMOGRAPHY | Facility: HOSPITAL | Age: 51
Discharge: HOME | End: 2021-09-10

## 2021-09-10 DIAGNOSIS — Z12.31 ENCOUNTER FOR SCREENING MAMMOGRAM FOR MALIGNANT NEOPLASM OF BREAST: ICD-10-CM

## 2021-09-10 DIAGNOSIS — R20.0 NUMBNESS OF FINGERS: ICD-10-CM

## 2021-09-10 DIAGNOSIS — R20.0 NUMBNESS AND TINGLING SENSATION OF SKIN: ICD-10-CM

## 2021-09-10 DIAGNOSIS — R20.2 NUMBNESS AND TINGLING SENSATION OF SKIN: ICD-10-CM

## 2021-09-10 PROCEDURE — 77067 SCR MAMMO BI INCL CAD: CPT

## 2021-09-10 PROCEDURE — 95886 MUSC TEST DONE W/N TEST COMP: CPT

## 2021-09-10 PROCEDURE — 77067 SCR MAMMO BI INCL CAD: CPT | Performed by: RADIOLOGY

## 2021-09-10 PROCEDURE — 77063 BREAST TOMOSYNTHESIS BI: CPT | Performed by: RADIOLOGY

## 2021-09-10 PROCEDURE — 95908 NRV CNDJ TST 3-4 STUDIES: CPT

## 2021-09-10 PROCEDURE — 77063 BREAST TOMOSYNTHESIS BI: CPT

## 2021-09-21 ENCOUNTER — TELEPHONE (OUTPATIENT)
Dept: FAMILY MEDICINE CLINIC | Facility: CLINIC | Age: 51
End: 2021-09-21

## 2021-09-21 DIAGNOSIS — J45.20 MILD INTERMITTENT ASTHMA WITHOUT COMPLICATION: ICD-10-CM

## 2021-09-21 DIAGNOSIS — F17.200 CURRENT SMOKER: ICD-10-CM

## 2021-09-21 RX ORDER — ALBUTEROL SULFATE 90 UG/1
2 AEROSOL, METERED RESPIRATORY (INHALATION) EVERY 4 HOURS PRN
Qty: 18 G | Refills: 5 | Status: SHIPPED | OUTPATIENT
Start: 2021-09-21 | End: 2022-02-08

## 2021-09-21 RX ORDER — BUDESONIDE AND FORMOTEROL FUMARATE DIHYDRATE 160; 4.5 UG/1; UG/1
2 AEROSOL RESPIRATORY (INHALATION)
Qty: 10.2 G | Refills: 3 | Status: SHIPPED | OUTPATIENT
Start: 2021-09-21 | End: 2023-03-09 | Stop reason: SDUPTHER

## 2021-09-21 NOTE — TELEPHONE ENCOUNTER
[employer] called, stating starting 9/29 the brand name will only be covered instead of the generic for  albuterol sulfate  (90 Base) MCG/ACT inhaler and budesonide-formoterol (SYMBICORT) 160-4.5 MCG/ACT inhaler. If patient is needing generic an PA would need to be resubmitted.

## 2021-09-28 RX ORDER — SODIUM, POTASSIUM,MAG SULFATES 17.5-3.13G
2 SOLUTION, RECONSTITUTED, ORAL ORAL TAKE AS DIRECTED
Qty: 354 ML | Refills: 0 | Status: SHIPPED | OUTPATIENT
Start: 2021-09-28

## 2021-10-15 ENCOUNTER — HOSPITAL ENCOUNTER (OUTPATIENT)
Dept: MAMMOGRAPHY | Facility: HOSPITAL | Age: 51
Discharge: HOME | End: 2021-10-15

## 2021-10-15 ENCOUNTER — HOSPITAL ENCOUNTER (OUTPATIENT)
Dept: ULTRASOUND IMAGING | Facility: HOSPITAL | Age: 51
Discharge: HOME | End: 2021-10-15

## 2021-10-15 DIAGNOSIS — R92.8 ABNORMAL MAMMOGRAM: ICD-10-CM

## 2021-10-15 PROCEDURE — 77065 DX MAMMO INCL CAD UNI: CPT

## 2021-10-15 PROCEDURE — G0279 TOMOSYNTHESIS, MAMMO: HCPCS

## 2021-10-15 PROCEDURE — 77065 DX MAMMO INCL CAD UNI: CPT | Performed by: RADIOLOGY

## 2021-10-15 PROCEDURE — 77061 BREAST TOMOSYNTHESIS UNI: CPT | Performed by: RADIOLOGY

## 2021-10-15 PROCEDURE — 76642 ULTRASOUND BREAST LIMITED: CPT

## 2021-10-15 PROCEDURE — 76642 ULTRASOUND BREAST LIMITED: CPT | Performed by: RADIOLOGY

## 2021-10-18 ENCOUNTER — TRANSCRIBE ORDERS (OUTPATIENT)
Dept: MAMMOGRAPHY | Facility: HOSPITAL | Age: 51
End: 2021-10-18

## 2021-10-18 DIAGNOSIS — R92.8 ABNORMAL MAMMOGRAM: Primary | ICD-10-CM

## 2021-10-28 ENCOUNTER — OFFICE VISIT (OUTPATIENT)
Dept: FAMILY MEDICINE CLINIC | Facility: CLINIC | Age: 51
End: 2021-10-28

## 2021-10-28 VITALS
WEIGHT: 186.4 LBS | RESPIRATION RATE: 16 BRPM | TEMPERATURE: 96.9 F | OXYGEN SATURATION: 93 % | SYSTOLIC BLOOD PRESSURE: 130 MMHG | BODY MASS INDEX: 29.96 KG/M2 | HEART RATE: 80 BPM | DIASTOLIC BLOOD PRESSURE: 90 MMHG | HEIGHT: 66 IN

## 2021-10-28 DIAGNOSIS — R73.03 PREDIABETES: ICD-10-CM

## 2021-10-28 DIAGNOSIS — E55.9 VITAMIN D DEFICIENCY: ICD-10-CM

## 2021-10-28 DIAGNOSIS — G89.29 CHRONIC PAIN OF TOE OF RIGHT FOOT: ICD-10-CM

## 2021-10-28 DIAGNOSIS — Z87.891 PERSONAL HISTORY OF TOBACCO USE, PRESENTING HAZARDS TO HEALTH: ICD-10-CM

## 2021-10-28 DIAGNOSIS — Z98.890 HISTORY OF BUNIONECTOMY OF RIGHT GREAT TOE: Primary | ICD-10-CM

## 2021-10-28 DIAGNOSIS — S90.414A ABRASION OF SECOND TOE OF RIGHT FOOT, INITIAL ENCOUNTER: ICD-10-CM

## 2021-10-28 DIAGNOSIS — E78.2 MIXED HYPERLIPIDEMIA: ICD-10-CM

## 2021-10-28 DIAGNOSIS — M79.674 CHRONIC PAIN OF TOE OF RIGHT FOOT: ICD-10-CM

## 2021-10-28 PROCEDURE — 99213 OFFICE O/P EST LOW 20 MIN: CPT | Performed by: NURSE PRACTITIONER

## 2021-11-01 ENCOUNTER — TELEPHONE (OUTPATIENT)
Dept: PULMONOLOGY | Facility: CLINIC | Age: 51
End: 2021-11-01

## 2021-11-02 ENCOUNTER — LAB (OUTPATIENT)
Dept: LAB | Facility: HOSPITAL | Age: 51
End: 2021-11-02

## 2021-11-02 ENCOUNTER — OFFICE VISIT (OUTPATIENT)
Dept: FAMILY MEDICINE CLINIC | Facility: CLINIC | Age: 51
End: 2021-11-02

## 2021-11-02 VITALS
TEMPERATURE: 97.7 F | BODY MASS INDEX: 29.89 KG/M2 | DIASTOLIC BLOOD PRESSURE: 82 MMHG | HEIGHT: 66 IN | SYSTOLIC BLOOD PRESSURE: 128 MMHG | OXYGEN SATURATION: 98 % | HEART RATE: 104 BPM | WEIGHT: 186 LBS

## 2021-11-02 DIAGNOSIS — R73.03 PREDIABETES: ICD-10-CM

## 2021-11-02 DIAGNOSIS — E78.2 MIXED HYPERLIPIDEMIA: Primary | ICD-10-CM

## 2021-11-02 DIAGNOSIS — E55.9 VITAMIN D DEFICIENCY: ICD-10-CM

## 2021-11-02 DIAGNOSIS — E78.2 MIXED HYPERLIPIDEMIA: ICD-10-CM

## 2021-11-02 DIAGNOSIS — T63.301A SPIDER BITE WOUND, ACCIDENTAL OR UNINTENTIONAL, INITIAL ENCOUNTER: ICD-10-CM

## 2021-11-02 LAB
25(OH)D3 SERPL-MCNC: 28.9 NG/ML
ALBUMIN SERPL-MCNC: 4.4 G/DL (ref 3.5–5.2)
ALBUMIN/GLOB SERPL: 1.6 G/DL
ALP SERPL-CCNC: 96 U/L (ref 39–117)
ALT SERPL W P-5'-P-CCNC: 12 U/L (ref 1–33)
ANION GAP SERPL CALCULATED.3IONS-SCNC: 7.4 MMOL/L (ref 5–15)
AST SERPL-CCNC: 17 U/L (ref 1–32)
BILIRUB SERPL-MCNC: 0.3 MG/DL (ref 0–1.2)
BUN SERPL-MCNC: 11 MG/DL (ref 6–20)
BUN/CREAT SERPL: 12.4 (ref 7–25)
CALCIUM SPEC-SCNC: 9.3 MG/DL (ref 8.6–10.5)
CHLORIDE SERPL-SCNC: 105 MMOL/L (ref 98–107)
CHOLEST SERPL-MCNC: 177 MG/DL (ref 0–200)
CO2 SERPL-SCNC: 25.6 MMOL/L (ref 22–29)
CREAT SERPL-MCNC: 0.89 MG/DL (ref 0.57–1)
GFR SERPL CREATININE-BSD FRML MDRD: 67 ML/MIN/1.73
GLOBULIN UR ELPH-MCNC: 2.7 GM/DL
GLUCOSE SERPL-MCNC: 126 MG/DL (ref 65–99)
HBA1C MFR BLD: 6.25 % (ref 4.8–5.6)
HDLC SERPL-MCNC: 45 MG/DL (ref 40–60)
LDLC SERPL CALC-MCNC: 111 MG/DL (ref 0–100)
LDLC/HDLC SERPL: 2.41 {RATIO}
POTASSIUM SERPL-SCNC: 4.2 MMOL/L (ref 3.5–5.2)
PROT SERPL-MCNC: 7.1 G/DL (ref 6–8.5)
SODIUM SERPL-SCNC: 138 MMOL/L (ref 136–145)
TRIGL SERPL-MCNC: 117 MG/DL (ref 0–150)
VLDLC SERPL-MCNC: 21 MG/DL (ref 5–40)

## 2021-11-02 PROCEDURE — 83036 HEMOGLOBIN GLYCOSYLATED A1C: CPT

## 2021-11-02 PROCEDURE — 82306 VITAMIN D 25 HYDROXY: CPT

## 2021-11-02 PROCEDURE — 99213 OFFICE O/P EST LOW 20 MIN: CPT | Performed by: NURSE PRACTITIONER

## 2021-11-02 PROCEDURE — 80053 COMPREHEN METABOLIC PANEL: CPT

## 2021-11-02 PROCEDURE — 80061 LIPID PANEL: CPT

## 2021-11-02 NOTE — PROGRESS NOTES
Subjective   Shruthi Boone is a 51 y.o. female.   Chief Complaint   Patient presents with   • Hyperlipidemia     3 mo f/u   • Prediabetes     Pt states she has been watching what she eats.       History of Present Illness   Patient is here for follow up, she just had her labs drawn this morning, so we do not have results back. Had ran out of 80 mg atorvastatin, trig 600; high cholesterol  Stress test is rescheduled per patient  Spider bite on left hand, swelling, no redness; happened Sunday night, she did see the black spider on her hand  The following portions of the patient's history were reviewed and updated as appropriate: allergies, current medications and problem list.    Review of Systems   Constitutional: Negative for chills, fatigue, fever and unexpected weight change.   Eyes: Negative for visual disturbance.   Respiratory: Positive for cough (has cut back to 2 cigarettes a day) and shortness of breath. Negative for wheezing.    Cardiovascular: Negative for chest pain, palpitations and leg swelling.   Gastrointestinal: Negative for abdominal pain, constipation and diarrhea.   Genitourinary: Negative for difficulty urinating and dysuria.   Musculoskeletal: Positive for arthralgias and gait problem.   Skin:        Left hand swelling from spider bite, no s/s of infection    Neurological: Positive for headaches. Negative for dizziness and light-headedness.       Objective   Physical Exam  Vitals reviewed.   Constitutional:       General: She is not in acute distress.     Appearance: Normal appearance. She is not ill-appearing, toxic-appearing or diaphoretic.   HENT:      Head: Normocephalic and atraumatic.      Right Ear: Tympanic membrane normal.      Left Ear: Tympanic membrane normal.   Neck:      Vascular: No carotid bruit.   Cardiovascular:      Rate and Rhythm: Regular rhythm. Tachycardia present.      Pulses: Normal pulses.      Heart sounds: No murmur heard.      Pulmonary:      Effort: Pulmonary  "effort is normal. No respiratory distress.      Breath sounds: Normal breath sounds. No rhonchi.   Abdominal:      Palpations: Abdomen is soft.   Neurological:      Mental Status: She is alert.   Psychiatric:         Mood and Affect: Mood normal.         Thought Content: Thought content normal.       /82   Pulse 104   Temp 97.7 °F (36.5 °C)   Ht 167 cm (65.75\")   Wt 84.4 kg (186 lb)   LMP 10/20/2017 (Approximate)   SpO2 98%   BMI 30.25 kg/m²     Assessment/Plan   Diagnoses and all orders for this visit:    1. Mixed hyperlipidemia (Primary)    2. Prediabetes    3. Spider bite wound, accidental or unintentional, initial encounter      Repeat labs are pending, continue statin for HLD  A1c in progress to check status of prediabetes  Monitor spider bite for s/s of infection; no necrosis at this time  Nutrition and activity goals reviewed including: mainly water to drink, limit white flour/processed sugar, and processed foods, choose fresh fruits, vegetables, fish, lean meats,high fiber carbs, exercise  working toward 150 mins cardio per week, weight training 2x/week.           "

## 2021-11-02 NOTE — PATIENT INSTRUCTIONS
"High Cholesterol    High cholesterol is a condition in which the blood has high levels of a white, waxy substance similar to fat (cholesterol). The liver makes all the cholesterol that the body needs. The human body needs small amounts of cholesterol to help build cells. A person gets extra or excess cholesterol from the food that he or she eats.  The blood carries cholesterol from the liver to the rest of the body. If you have high cholesterol, deposits (plaques) may build up on the walls of your arteries. Arteries are the blood vessels that carry blood away from your heart. These plaques make the arteries narrow and stiff.  Cholesterol plaques increase your risk for heart attack and stroke. Work with your health care provider to keep your cholesterol levels in a healthy range.  What increases the risk?  The following factors may make you more likely to develop this condition:  · Eating foods that are high in animal fat (saturated fat) or cholesterol.  · Being overweight.  · Not getting enough exercise.  · A family history of high cholesterol (familial hypercholesterolemia).  · Use of tobacco products.  · Having diabetes.  What are the signs or symptoms?  There are no symptoms of this condition.  How is this diagnosed?  This condition may be diagnosed based on the results of a blood test.  · If you are older than 20 years of age, your health care provider may check your cholesterol levels every 4-6 years.  · You may be checked more often if you have high cholesterol or other risk factors for heart disease.  The blood test for cholesterol measures:  · \"Bad\" cholesterol, or LDL cholesterol. This is the main type of cholesterol that causes heart disease. The desired level is less than 100 mg/dL.  · \"Good\" cholesterol, or HDL cholesterol. HDL helps protect against heart disease by cleaning the arteries and carrying the LDL to the liver for processing. The desired level for HDL is 60 mg/dL or higher.  · Triglycerides. " These are fats that your body can store or burn for energy. The desired level is less than 150 mg/dL.  · Total cholesterol. This measures the total amount of cholesterol in your blood and includes LDL, HDL, and triglycerides. The desired level is less than 200 mg/dL.  How is this treated?  This condition may be treated with:  · Diet changes. You may be asked to eat foods that have more fiber and less saturated fats or added sugar.  · Lifestyle changes. These may include regular exercise, maintaining a healthy weight, and quitting use of tobacco products.  · Medicines. These are given when diet and lifestyle changes have not worked. You may be prescribed a statin medicine to help lower your cholesterol levels.  Follow these instructions at home:  Eating and drinking    · Eat a healthy, balanced diet. This diet includes:  ? Daily servings of a variety of fresh, frozen, or canned fruits and vegetables.  ? Daily servings of whole grain foods that are rich in fiber.  ? Foods that are low in saturated fats and trans fats. These include poultry and fish without skin, lean cuts of meat, and low-fat dairy products.  ? A variety of fish, especially oily fish that contain omega-3 fatty acids. Aim to eat fish at least 2 times a week.  · Avoid foods and drinks that have added sugar.  · Use healthy cooking methods, such as roasting, grilling, broiling, baking, poaching, steaming, and stir-frying. Do not sin your food except for stir-frying.    Lifestyle    · Get regular exercise. Aim to exercise for a total of 150 minutes a week. Increase your activity level by doing activities such as gardening, walking, and taking the stairs.  · Do not use any products that contain nicotine or tobacco, such as cigarettes, e-cigarettes, and chewing tobacco. If you need help quitting, ask your health care provider.    General instructions  · Take over-the-counter and prescription medicines only as told by your health care provider.  · Keep all  "follow-up visits as told by your health care provider. This is important.  Where to find more information  · American Heart Association: www.heart.org  · National Heart, Lung, and [employer] [place]: www.nhlbi.nih.gov  Contact a health care provider if:  · You have trouble achieving or maintaining a healthy diet or weight.  · You are starting an exercise program.  · You are unable to stop smoking.  Get help right away if:  · You have chest pain.  · You have trouble breathing.  · You have any symptoms of a stroke. \"BE FAST\" is an easy way to remember the main warning signs of a stroke:  ? B - Balance. Signs are dizziness, sudden trouble walking, or loss of balance.  ? E - Eyes. Signs are trouble seeing or a sudden change in vision.  ? F - Face. Signs are sudden weakness or numbness of the face, or the face or eyelid drooping on one side.  ? A - Arms. Signs are weakness or numbness in an arm. This happens suddenly and usually on one side of the body.  ? S - Speech. Signs are sudden trouble speaking, slurred speech, or trouble understanding what people say.  ? T - Time. Time to call emergency services. Write down what time symptoms started.  · You have other signs of a stroke, such as:  ? A sudden, severe headache with no known cause.  ? Nausea or vomiting.  ? Seizure.  These symptoms may represent a serious problem that is an emergency. Do not wait to see if the symptoms will go away. Get medical help right away. Call your local emergency services (911 in the [place] Do not drive yourself to the hospital.  Summary  · Cholesterol plaques increase your risk for heart attack and stroke. Work with your health care provider to keep your cholesterol levels in a healthy range.  · Eat a healthy, balanced diet, get regular exercise, and maintain a healthy weight.  · Do not use any products that contain nicotine or tobacco, such as cigarettes, e-cigarettes, and chewing tobacco.  · Get help right away if you have any symptoms of a " stroke.  This information is not intended to replace advice given to you by your health care provider. Make sure you discuss any questions you have with your health care provider.  Document Revised: 11/16/2020 Document Reviewed: 11/16/2020  [employer] Patient Education © 2021 [employer]    High Triglycerides Eating Plan  Triglycerides are a type of fat in the blood. High levels of triglycerides can increase your risk of heart disease and stroke. If your triglyceride levels are high, choosing the right foods can help lower your triglycerides and keep your heart healthy. Work with your health care provider or a diet and nutrition specialist (dietitian) to develop an eating plan that is right for you.  What are tips for following this plan?  General guidelines    · Lose weight, if you are overweight. For most people, losing 5-10 lbs (2-5 kg) helps lower triglyceride levels. A weight-loss plan may include.  ? 30 minutes of exercise at least 5 days a week.  ? Reducing the amount of calories, sugar, and fat you eat.  · Eat a wide variety of fresh fruits, vegetables, and whole grains. These foods are high in fiber.  · Eat foods that contain healthy fats, such as fatty fish, nuts, seeds, and olive oil.  · Avoid foods that are high in added sugar, added salt (sodium), saturated fat, and trans fat.  · Avoid low-fiber, refined carbohydrates such as white bread, crackers, noodles, and white rice.  · Avoid foods with partially hydrogenated oils (trans fats), such as fried foods or stick margarine.  · Limit alcohol intake to no more than 1 drink a day for nonpregnant women and 2 drinks a day for men. One drink equals 12 oz of beer, 5 oz of wine, or 1½ oz of hard liquor. Your health care provider may recommend that you drink less depending on your overall health.    Reading food labels  · Check food labels for the amount of saturated fat. Choose foods with no or very little saturated fat.  · Check food labels for the amount of  trans fat. Choose foods with no trans fat.  · Check food labels for the amount of cholesterol. Choose foods low in cholesterol. Ask your dietitian how much cholesterol you should have each day.  · Check food labels for the amount of sodium. Choose foods with less than 140 milligrams (mg) per serving.  Shopping  · Buy dairy products labeled as nonfat (skim) or low-fat (1%).  · Avoid buying processed or prepackaged foods. These are often high in added sugar, sodium, and fat.  Cooking  · Choose healthy fats when cooking, such as olive oil or canola oil.  · Cook foods using lower fat methods, such as baking, broiling, boiling, or grilling.  · Make your own sauces, dressings, and marinades when possible, instead of buying them. Store-bought sauces, dressings, and marinades are often high in sodium and sugar.  Meal planning  · Eat more home-cooked food and less restaurant, buffet, and fast food.  · Eat fatty fish at least 2 times each week. Examples of fatty fish include salmon, trout, mackerel, tuna, and herring.  · If you eat whole eggs, do not eat more than 3 egg yolks per week.  What foods are recommended?  The items listed may not be a complete list. Talk with your dietitian about what dietary choices are best for you.  Grains  Whole wheat or whole grain breads, crackers, cereals, and pasta. Unsweetened oatmeal. Bulgur. Barley. Quinoa. Brown rice. Whole wheat flour tortillas.  Vegetables  Fresh or frozen vegetables. Low-sodium canned vegetables.  Fruits  All fresh, canned (in natural juice), or frozen fruits.  Meats and other protein foods  Skinless chicken or turkey. Ground chicken or turkey. Lean cuts of pork, trimmed of fat. Fish and seafood, especially salmon, trout, and herring. Egg whites. Dried beans, peas, or lentils. Unsalted nuts or seeds. Unsalted canned beans. Natural peanut or almond butter.  Dairy  Low-fat dairy products. Skim or low-fat (1%) milk. Reduced fat (2%) and low-sodium cheese. Low-fat ricotta  cheese. Low-fat cottage cheese. Plain, low-fat yogurt.  Fats and oils  Tub margarine without trans fats. Light or reduced-fat mayonnaise. Light or reduced-fat salad dressings. Avocado. Safflower, olive, sunflower, soybean, and canola oils.  What foods are not recommended?  The items listed may not be a complete list. Talk with your dietitian about what dietary choices are best for you.  Grains  White bread. White (regular) pasta. White rice. Cornbread. Bagels. Pastries. Crackers that contain trans fat.  Vegetables  Creamed or fried vegetables. Vegetables in a cheese sauce.  Fruits  Sweetened dried fruit. Canned fruit in syrup. Fruit juice.  Meats and other protein foods  Fatty cuts of meat. Ribs. Chicken wings. Davila. Sausage. Bologna. Salami. Chitterlings. Fatback. Hot dogs. Bratwurst. Packaged lunch meats.  Dairy  Whole or reduced-fat (2%) milk. Half-and-half. Cream cheese. Full-fat or sweetened yogurt. Full-fat cheese. Nondairy creamers. Whipped toppings. Processed cheese or cheese spreads. Cheese curds.  Beverages  Alcohol. Sweetened drinks, such as soda, lemonade, fruit drinks, or punches.  Fats and oils  Butter. Stick margarine. Lard. Shortening. Ghee. Davila fat. Tropical oils, such as coconut, palm kernel, or palm oils.  Sweets and desserts  Corn syrup. Sugars. Honey. Molasses. Candy. Jam and jelly. Syrup. Sweetened cereals. Cookies. Pies. Cakes. Donuts. Muffins. Ice cream.  Condiments  Store-bought sauces, dressings, and marinades that are high in sugar, such as ketchup and barbecue sauce.  Summary  · High levels of triglycerides can increase the risk of heart disease and stroke. Choosing the right foods can help lower your triglycerides.  · Eat plenty of fresh fruits, vegetables, and whole grains. Choose low-fat dairy and lean meats. Eat fatty fish at least twice a week.  · Avoid processed and prepackaged foods with added sugar, sodium, saturated fat, and trans fat.  · If you need suggestions or have  questions about what types of food are good for you, talk with your health care provider or a dietitian.  This information is not intended to replace advice given to you by your health care provider. Make sure you discuss any questions you have with your health care provider.  Document Revised: 04/21/2021 Document Reviewed: 04/21/2021  [employer] Patient Education © 2021 [employer]    Dyslipidemia  Dyslipidemia is an imbalance of waxy, fat-like substances (lipids) in the blood. The body needs lipids in small amounts. Dyslipidemia often involves a high level of cholesterol or triglycerides, which are types of lipids.  Common forms of dyslipidemia include:  · High levels of LDL cholesterol. LDL is the type of cholesterol that causes fatty deposits (plaques) to build up in the blood vessels that carry blood away from your heart (arteries).  · Low levels of HDL cholesterol. HDL cholesterol is the type of cholesterol that protects against heart disease. High levels of HDL remove the LDL buildup from arteries.  · High levels of triglycerides. Triglycerides are a fatty substance in the blood that is linked to a buildup of plaques in the arteries.  What are the causes?  Primary dyslipidemia is caused by changes (mutations) in genes that are passed down through families (inherited). These mutations cause several types of dyslipidemia.  Secondary dyslipidemia is caused by lifestyle choices and diseases that lead to dyslipidemia, such as:  · Eating a diet that is high in animal fat.  · Not getting enough exercise.  · Having diabetes, kidney disease, liver disease, or thyroid disease.  · Drinking large amounts of alcohol.  · Using certain medicines.  What increases the risk?  You are more likely to develop this condition if you are an older man or if you are a woman who has gone through menopause. Other risk factors include:  · Having a family history of dyslipidemia.  · Taking certain medicines, including birth control pills,  steroids, some diuretics, and beta-blockers.  · Smoking cigarettes.  · Eating a high-fat diet.  · Having certain medical conditions such as diabetes, polycystic ovary syndrome (PCOS), kidney disease, liver disease, or hypothyroidism.  · Not exercising regularly.  · Being overweight or obese with too much belly fat.  What are the signs or symptoms?  In most cases, dyslipidemia does not usually cause any symptoms.  In severe cases, very high lipid levels can cause:  · Fatty bumps under the skin (xanthomas).  · White or gray ring around the black center (pupil) of the eye.  Very high triglyceride levels can cause inflammation of the pancreas (pancreatitis).  How is this diagnosed?  Your health care provider may diagnose dyslipidemia based on a routine blood test (fasting blood test). Because most people do not have symptoms of the condition, this blood testing (lipid profile) is done on adults age 20 and older and is repeated every 5 years. This test checks:  · Total cholesterol. This measures the total amount of cholesterol in your blood, including LDL cholesterol, HDL cholesterol, and triglycerides. A healthy number is below 200.  · LDL cholesterol. The target number for LDL cholesterol is different for each person, depending on individual risk factors. Ask your health care provider what your LDL cholesterol should be.  · HDL cholesterol. An HDL level of 60 or higher is best because it helps to protect against heart disease. A number below 40 for men or below 50 for women increases the risk for heart disease.  · Triglycerides. A healthy triglyceride number is below 150.  If your lipid profile is abnormal, your health care provider may do other blood tests.  How is this treated?  Treatment depends on the type of dyslipidemia that you have and your other risk factors for heart disease and stroke. Your health care provider will have a target range for your lipid levels based on this information.  For many people, this  condition may be treated by lifestyle changes, such as diet and exercise. Your health care provider may recommend that you:  · Get regular exercise.  · Make changes to your diet.  · Quit smoking if you smoke.  If diet changes and exercise do not help you reach your goals, your health care provider may also prescribe medicine to lower lipids. The most commonly prescribed type of medicine lowers your LDL cholesterol (statin drug). If you have a high triglyceride level, your provider may prescribe another type of drug (fibrate) or an omega-3 fish oil supplement, or both.  Follow these instructions at home:    Eating and drinking  · Follow instructions from your health care provider or dietitian about eating or drinking restrictions.  · Eat a healthy diet as told by your health care provider. This can help you reach and maintain a healthy weight, lower your LDL cholesterol, and raise your HDL cholesterol. This may include:  ? Limiting your calories, if you are overweight.  ? Eating more fruits, vegetables, whole grains, fish, and lean meats.  ? Limiting saturated fat, trans fat, and cholesterol.  · If you drink alcohol:  ? Limit how much you use.  ? Be aware of how much alcohol is in your drink. In the [place] one drink equals one 12 oz bottle of beer (355 mL), one 5 oz glass of wine (148 mL), or one 1½ oz glass of hard liquor (44 mL).  · Do not drink alcohol if:  ? Your health care provider tells you not to drink.  ? You are pregnant, may be pregnant, or are planning to become pregnant.  Activity  · Get regular exercise. Start an exercise and strength training program as told by your health care provider. Ask your health care provider what activities are safe for you. Your health care provider may recommend:  ? 30 minutes of aerobic activity 4-6 days a week. Brisk walking is an example of aerobic activity.  ? Strength training 2 days a week.  General instructions  · Do not use any products that contain nicotine or  tobacco, such as cigarettes, e-cigarettes, and chewing tobacco. If you need help quitting, ask your health care provider.  · Take over-the-counter and prescription medicines only as told by your health care provider. This includes supplements.  · Keep all follow-up visits as told by your health care provider.  Contact a health care provider if:  · You are:  ? Having trouble sticking to your exercise or diet plan.  ? Struggling to quit smoking or control your use of alcohol.  Summary  · Dyslipidemia often involves a high level of cholesterol or triglycerides, which are types of lipids.  · Treatment depends on the type of dyslipidemia that you have and your other risk factors for heart disease and stroke.  · For many people, treatment starts with lifestyle changes, such as diet and exercise.  · Your health care provider may prescribe medicine to lower lipids.  This information is not intended to replace advice given to you by your health care provider. Make sure you discuss any questions you have with your health care provider.  Document Revised: 08/12/2019 Document Reviewed: 07/19/2019  [employer] Patient Education © 2021 [employer]    Managing Your Hypertension  Hypertension, also called high blood pressure, is when the force of the blood pressing against the walls of the arteries is too strong. Arteries are blood vessels that carry blood from your heart throughout your body. Hypertension forces the heart to work harder to pump blood and may cause the arteries to become narrow or stiff.  Understanding blood pressure readings  Your personal target blood pressure may vary depending on your medical conditions, your age, and other factors. A blood pressure reading includes a higher number over a lower number. Ideally, your blood pressure should be below 120/80. You should know that:  · The first, or top, number is called the systolic pressure. It is a measure of the pressure in your arteries as your heart beats.  · The  second, or bottom number, is called the diastolic pressure. It is a measure of the pressure in your arteries as the heart relaxes.  Blood pressure is classified into four stages. Based on your blood pressure reading, your health care provider may use the following stages to determine what type of treatment you need, if any. Systolic pressure and diastolic pressure are measured in a unit called mmHg.  Normal  · Systolic pressure: below 120.  · Diastolic pressure: below 80.  Elevated  · Systolic pressure: 120-129.  · Diastolic pressure: below 80.  Hypertension stage 1  · Systolic pressure: 130-139.  · Diastolic pressure: 80-89.  Hypertension stage 2  · Systolic pressure: 140 or above.  · Diastolic pressure: 90 or above.  How can this condition affect me?  Managing your hypertension is an important responsibility. Over time, hypertension can damage the arteries and decrease blood flow to important parts of the body, including the brain, heart, and kidneys. Having untreated or uncontrolled hypertension can lead to:  · A heart attack.  · A stroke.  · A weakened blood vessel (aneurysm).  · Heart failure.  · Kidney damage.  · Eye damage.  · Metabolic syndrome.  · Memory and concentration problems.  · Vascular dementia.  What actions can I take to manage this condition?  Hypertension can be managed by making lifestyle changes and possibly by taking medicines. Your health care provider will help you make a plan to bring your blood pressure within a normal range.  Nutrition    · Eat a diet that is high in fiber and potassium, and low in salt (sodium), added sugar, and fat. An example eating plan is called the Dietary Approaches to Stop Hypertension (DASH) diet. To eat this way:  ? Eat plenty of fresh fruits and vegetables. Try to fill one-half of your plate at each meal with fruits and vegetables.  ? Eat whole grains, such as whole-wheat pasta, brown rice, or whole-grain bread. Fill about one-fourth of your plate with whole  grains.  ? Eat low-fat dairy products.  ? Avoid fatty cuts of meat, processed or cured meats, and poultry with skin. Fill about one-fourth of your plate with lean proteins such as fish, chicken without skin, beans, eggs, and tofu.  ? Avoid pre-made and processed foods. These tend to be higher in sodium, added sugar, and fat.  · Reduce your daily sodium intake. Most people with hypertension should eat less than 1,500 mg of sodium a day.    Lifestyle    · Work with your health care provider to maintain a healthy body weight or to lose weight. Ask what an ideal weight is for you.  · Get at least 30 minutes of exercise that causes your heart to beat faster (aerobic exercise) most days of the week. Activities may include walking, swimming, or biking.  · Include exercise to strengthen your muscles (resistance exercise), such as weight lifting, as part of your weekly exercise routine. Try to do these types of exercises for 30 minutes at least 3 days a week.  · Do not use any products that contain nicotine or tobacco, such as cigarettes, e-cigarettes, and chewing tobacco. If you need help quitting, ask your health care provider.  · Control any long-term (chronic) conditions you have, such as high cholesterol or diabetes.  · Identify your sources of stress and find ways to manage stress. This may include meditation, deep breathing, or making time for fun activities.    Alcohol use  · Do not drink alcohol if:  ? Your health care provider tells you not to drink.  ? You are pregnant, may be pregnant, or are planning to become pregnant.  · If you drink alcohol:  ? Limit how much you use to:  § 0-1 drink a day for women.  § 0-2 drinks a day for men.  ? Be aware of how much alcohol is in your drink. In the [place] one drink equals one 12 oz bottle of beer (355 mL), one 5 oz glass of wine (148 mL), or one 1½ oz glass of hard liquor (44 mL).  Medicines  Your health care provider may prescribe medicine if lifestyle changes are not  enough to get your blood pressure under control and if:  · Your systolic blood pressure is 130 or higher.  · Your diastolic blood pressure is 80 or higher.  Take medicines only as told by your health care provider. Follow the directions carefully. Blood pressure medicines must be taken as told by your health care provider. The medicine does not work as well when you skip doses. Skipping doses also puts you at risk for problems.  Monitoring  Before you monitor your blood pressure:  · Do not smoke, drink caffeinated beverages, or exercise within 30 minutes before taking a measurement.  · Use the bathroom and empty your bladder (urinate).  · Sit quietly for at least 5 minutes before taking measurements.  Monitor your blood pressure at home as told by your health care provider. To do this:  · Sit with your back straight and supported.  · Place your feet flat on the floor. Do not cross your legs.  · Support your arm on a flat surface, such as a table. Make sure your upper arm is at heart level.  · Each time you measure, take two or three readings one minute apart and record the results.  You may also need to have your blood pressure checked regularly by your health care provider.  General information  · Talk with your health care provider about your diet, exercise habits, and other lifestyle factors that may be contributing to hypertension.  · Review all the medicines you take with your health care provider because there may be side effects or interactions.  · Keep all visits as told by your health care provider. Your health care provider can help you create and adjust your plan for managing your high blood pressure.  Where to find more information  · National Heart, Lung, and Blood [place]: www.nhlbi.nih.gov  · American Heart Association: www.heart.org  Contact a health care provider if:  · You think you are having a reaction to medicines you have taken.  · You have repeated (recurrent) headaches.  · You feel  "dizzy.  · You have swelling in your ankles.  · You have trouble with your vision.  Get help right away if:  · You develop a severe headache or confusion.  · You have unusual weakness or numbness, or you feel faint.  · You have severe pain in your chest or abdomen.  · You vomit repeatedly.  · You have trouble breathing.  These symptoms may represent a serious problem that is an emergency. Do not wait to see if the symptoms will go away. Get medical help right away. Call your local emergency services (911 in the [place] Do not drive yourself to the hospital.  Summary  · Hypertension is when the force of blood pumping through your arteries is too strong. If this condition is not controlled, it may put you at risk for serious complications.  · Your personal target blood pressure may vary depending on your medical conditions, your age, and other factors. For most people, a normal blood pressure is less than 120/80.  · Hypertension is managed by lifestyle changes, medicines, or both.  · Lifestyle changes to help manage hypertension include losing weight, eating a healthy, low-sodium diet, exercising more, stopping smoking, and limiting alcohol.  This information is not intended to replace advice given to you by your health care provider. Make sure you discuss any questions you have with your health care provider.  Document Revised: 01/22/2021 Document Reviewed: 11/17/2020  [employer] Patient Education © 2021 [employer]    https://www.nhlbi.nih.gov/files/docs/public/heart/dash_brief.pdf\">   DASH Eating Plan  DASH stands for Dietary Approaches to Stop Hypertension. The DASH eating plan is a healthy eating plan that has been shown to:  · Reduce high blood pressure (hypertension).  · Reduce your risk for type 2 diabetes, heart disease, and stroke.  · Help with weight loss.  What are tips for following this plan?  Reading food labels  · Check food labels for the amount of salt (sodium) per serving. Choose foods with less than " "5 percent of the Daily Value of sodium. Generally, foods with less than 300 milligrams (mg) of sodium per serving fit into this eating plan.  · To find whole grains, look for the word \"whole\" as the first word in the ingredient list.  Shopping  · Buy products labeled as \"low-sodium\" or \"no salt added.\"  · Buy fresh foods. Avoid canned foods and pre-made or frozen meals.  Cooking  · Avoid adding salt when cooking. Use salt-free seasonings or herbs instead of table salt or sea salt. Check with your health care provider or pharmacist before using salt substitutes.  · Do not sin foods. Cook foods using healthy methods such as baking, boiling, grilling, roasting, and broiling instead.  · Cook with heart-healthy oils, such as olive, canola, avocado, soybean, or sunflower oil.  Meal planning    · Eat a balanced diet that includes:  ? 4 or more servings of fruits and 4 or more servings of vegetables each day. Try to fill one-half of your plate with fruits and vegetables.  ? 6-8 servings of whole grains each day.  ? Less than 6 oz (170 g) of lean meat, poultry, or fish each day. A 3-oz (85-g) serving of meat is about the same size as a deck of cards. One egg equals 1 oz (28 g).  ? 2-3 servings of low-fat dairy each day. One serving is 1 cup (237 mL).  ? 1 serving of nuts, seeds, or beans 5 times each week.  ? 2-3 servings of heart-healthy fats. Healthy fats called omega-3 fatty acids are found in foods such as walnuts, flaxseeds, fortified milks, and eggs. These fats are also found in cold-water fish, such as sardines, salmon, and mackerel.  · Limit how much you eat of:  ? Canned or prepackaged foods.  ? Food that is high in trans fat, such as some fried foods.  ? Food that is high in saturated fat, such as fatty meat.  ? Desserts and other sweets, sugary drinks, and other foods with added sugar.  ? Full-fat dairy products.  · Do not salt foods before eating.  · Do not eat more than 4 egg yolks a week.  · Try to eat at least " 2 vegetarian meals a week.  · Eat more home-cooked food and less restaurant, buffet, and fast food.    Lifestyle  · When eating at a restaurant, ask that your food be prepared with less salt or no salt, if possible.  · If you drink alcohol:  ? Limit how much you use to:  § 0-1 drink a day for women who are not pregnant.  § 0-2 drinks a day for men.  ? Be aware of how much alcohol is in your drink. In the [place] one drink equals one 12 oz bottle of beer (355 mL), one 5 oz glass of wine (148 mL), or one 1½ oz glass of hard liquor (44 mL).  General information  · Avoid eating more than 2,300 mg of salt a day. If you have hypertension, you may need to reduce your sodium intake to 1,500 mg a day.  · Work with your health care provider to maintain a healthy body weight or to lose weight. Ask what an ideal weight is for you.  · Get at least 30 minutes of exercise that causes your heart to beat faster (aerobic exercise) most days of the week. Activities may include walking, swimming, or biking.  · Work with your health care provider or dietitian to adjust your eating plan to your individual calorie needs.  What foods should I eat?  Fruits  All fresh, dried, or frozen fruit. Canned fruit in natural juice (without added sugar).  Vegetables  Fresh or frozen vegetables (raw, steamed, roasted, or grilled). Low-sodium or reduced-sodium tomato and vegetable juice. Low-sodium or reduced-sodium tomato sauce and tomato paste. Low-sodium or reduced-sodium canned vegetables.  Grains  Whole-grain or whole-wheat bread. Whole-grain or whole-wheat pasta. Brown rice. Oatmeal. Quinoa. Bulgur. Whole-grain and low-sodium cereals. Aysha bread. Low-fat, low-sodium crackers. Whole-wheat flour tortillas.  Meats and other proteins  Skinless chicken or turkey. Ground chicken or turkey. Pork with fat trimmed off. Fish and seafood. Egg whites. Dried beans, peas, or lentils. Unsalted nuts, nut butters, and seeds. Unsalted canned beans. Lean cuts of beef  with fat trimmed off. Low-sodium, lean precooked or cured meat, such as sausages or meat loaves.  Dairy  Low-fat (1%) or fat-free (skim) milk. Reduced-fat, low-fat, or fat-free cheeses. Nonfat, low-sodium ricotta or cottage cheese. Low-fat or nonfat yogurt. Low-fat, low-sodium cheese.  Fats and oils  Soft margarine without trans fats. Vegetable oil. Reduced-fat, low-fat, or light mayonnaise and salad dressings (reduced-sodium). Canola, safflower, olive, avocado, soybean, and sunflower oils. Avocado.  Seasonings and condiments  Herbs. Spices. Seasoning mixes without salt.  Other foods  Unsalted popcorn and pretzels. Fat-free sweets.  The items listed above may not be a complete list of foods and beverages you can eat. Contact a dietitian for more information.  What foods should I avoid?  Fruits  Canned fruit in a light or heavy syrup. Fried fruit. Fruit in cream or butter sauce.  Vegetables  Creamed or fried vegetables. Vegetables in a cheese sauce. Regular canned vegetables (not low-sodium or reduced-sodium). Regular canned tomato sauce and paste (not low-sodium or reduced-sodium). Regular tomato and vegetable juice (not low-sodium or reduced-sodium). Pickles. Olives.  Grains  Baked goods made with fat, such as croissants, muffins, or some breads. Dry pasta or rice meal packs.  Meats and other proteins  Fatty cuts of meat. Ribs. Fried meat. Davila. Bologna, salami, and other precooked or cured meats, such as sausages or meat loaves. Fat from the back of a pig (fatback). Bratwurst. Salted nuts and seeds. Canned beans with added salt. Canned or smoked fish. Whole eggs or egg yolks. Chicken or turkey with skin.  Dairy  Whole or 2% milk, cream, and half-and-half. Whole or full-fat cream cheese. Whole-fat or sweetened yogurt. Full-fat cheese. Nondairy creamers. Whipped toppings. Processed cheese and cheese spreads.  Fats and oils  Butter. Stick margarine. Lard. Shortening. Ghee. Davila fat. Tropical oils, such as coconut,  palm kernel, or palm oil.  Seasonings and condiments  Onion salt, garlic salt, seasoned salt, table salt, and sea salt. Worcestershire sauce. Tartar sauce. Barbecue sauce. Teriyaki sauce. Soy sauce, including reduced-sodium. Steak sauce. Canned and packaged gravies. Fish sauce. Oyster sauce. Cocktail sauce. Store-bought horseradish. Ketchup. Mustard. Meat flavorings and tenderizers. Bouillon cubes. Hot sauces. Pre-made or packaged marinades. Pre-made or packaged taco seasonings. Relishes. Regular salad dressings.  Other foods  Salted popcorn and pretzels.  The items listed above may not be a complete list of foods and beverages you should avoid. Contact a dietitian for more information.  Where to find more information  · National Heart, Lung, and Blood [place]: www.nhlbi.nih.gov  · American Heart Association: www.heart.org  · Academy of Nutrition and Dietetics: www.eatright.org  · National Kidney Foundation: www.kidney.org  Summary  · The DASH eating plan is a healthy eating plan that has been shown to reduce high blood pressure (hypertension). It may also reduce your risk for type 2 diabetes, heart disease, and stroke.  · When on the DASH eating plan, aim to eat more fresh fruits and vegetables, whole grains, lean proteins, low-fat dairy, and heart-healthy fats.  · With the DASH eating plan, you should limit salt (sodium) intake to 2,300 mg a day. If you have hypertension, you may need to reduce your sodium intake to 1,500 mg a day.  · Work with your health care provider or dietitian to adjust your eating plan to your individual calorie needs.  This information is not intended to replace advice given to you by your health care provider. Make sure you discuss any questions you have with your health care provider.  Document Revised: 11/20/2020 Document Reviewed: 11/20/2020  [employer] Patient Education © 2021 [employer]    For more information:    Quit Now  [place]  1-800-QUIT-NOW  https://[place].quitlogix.org/en-[place]/  Steps to Quit Smoking  Smoking tobacco can be harmful to your health and can affect almost every organ in your body. Smoking puts you, and those around you, at risk for developing many serious chronic diseases. Quitting smoking is difficult, but it is one of the best things that you can do for your health. It is never too late to quit.  What are the benefits of quitting smoking?  When you quit smoking, you lower your risk of developing serious diseases and conditions, such as:  · Lung cancer or lung disease, such as COPD.  · Heart disease.  · Stroke.  · Heart attack.  · Infertility.  · Osteoporosis and bone fractures.  Additionally, symptoms such as coughing, wheezing, and shortness of breath may get better when you quit. You may also find that you get sick less often because your body is stronger at fighting off colds and infections. If you are pregnant, quitting smoking can help to reduce your chances of having a baby of low birth weight.  How do I get ready to quit?  When you decide to quit smoking, create a plan to make sure that you are successful. Before you quit:  · Pick a date to quit. Set a date within the next two weeks to give you time to prepare.  · Write down the reasons why you are quitting. Keep this list in places where you will see it often, such as on your bathroom mirror or in your car or wallet.  · Identify the people, places, things, and activities that make you want to smoke (triggers) and avoid them. Make sure to take these actions:  ¨ Throw away all cigarettes at home, at work, and in your car.  ¨ Throw away smoking accessories, such as ashtrays and lighters.  ¨ Clean your car and make sure to empty the ashtray.  ¨ Clean your home, including curtains and carpets.  · Tell your family, friends, and coworkers that you are quitting. Support from your loved ones can make quitting easier.  · Talk with your health care provider about  your options for quitting smoking.  · Find out what treatment options are covered by your health insurance.  What strategies can I use to quit smoking?  Talk with your healthcare provider about different strategies to quit smoking. Some strategies include:  · Quitting smoking altogether instead of gradually lessening how much you smoke over a period of time. Research shows that quitting “cold turkey” is more successful than gradually quitting.  · Attending in-person counseling to help you build problem-solving skills. You are more likely to have success in quitting if you attend several counseling sessions. Even short sessions of 10 minutes can be effective.  · Finding resources and support systems that can help you to quit smoking and remain smoke-free after you quit. These resources are most helpful when you use them often. They can include:  ¨ Online chats with a counselor.  ¨ Telephone quitlines.  ¨ Printed self-help materials.  ¨ Support groups or group counseling.  ¨ Text messaging programs.  ¨ Mobile phone applications.  · Taking medicines to help you quit smoking. (If you are pregnant or breastfeeding, talk with your health care provider first.) Some medicines contain nicotine and some do not. Both types of medicines help with cravings, but the medicines that include nicotine help to relieve withdrawal symptoms. Your health care provider may recommend:  ¨ Nicotine patches, gum, or lozenges.  ¨ Nicotine inhalers or sprays.  ¨ Non-nicotine medicine that is taken by mouth.  Talk with your health care provider about combining strategies, such as taking medicines while you are also receiving in-person counseling. Using these two strategies together makes you more likely to succeed in quitting than if you used either strategy on its own.  If you are pregnant or breastfeeding, talk with your health care provider about finding counseling or other support strategies to quit smoking. Do not take medicine to help you  quit smoking unless told to do so by your health care provider.  What things can I do to make it easier to quit?  Quitting smoking might feel overwhelming at first, but there is a lot that you can do to make it easier. Take these important actions:  · Reach out to your family and friends and ask that they support and encourage you during this time. Call telephone quitlines, reach out to support groups, or work with a counselor for support.  · Ask people who smoke to avoid smoking around you.  · Avoid places that trigger you to smoke, such as bars, parties, or smoke-break areas at work.  · Spend time around people who do not smoke.  · Lessen stress in your life, because stress can be a smoking trigger for some people. To lessen stress, try:  ¨ Exercising regularly.  ¨ Deep-breathing exercises.  ¨ Yoga.  ¨ Meditating.  ¨ Performing a body scan. This involves closing your eyes, scanning your body from head to toe, and noticing which parts of your body are particularly tense. Purposefully relax the muscles in those areas.  · Download or purchase mobile phone or tablet apps (applications) that can help you stick to your quit plan by providing reminders, tips, and encouragement. There are many free apps, such as QuitGuide from the CDC (Centers for Disease Control and Prevention). You can find other support for quitting smoking (smoking cessation) through smokefree.gov and other websites.  How will I feel when I quit smoking?  Within the first 24 hours of quitting smoking, you may start to feel some withdrawal symptoms. These symptoms are usually most noticeable 2-3 days after quitting, but they usually do not last beyond 2-3 weeks. Changes or symptoms that you might experience include:  · Mood swings.  · Restlessness, anxiety, or irritation.  · Difficulty concentrating.  · Dizziness.  · Strong cravings for sugary foods in addition to nicotine.  · Mild weight gain.  · Constipation.  · Nausea.  · Coughing or a sore  throat.  · Changes in how your medicines work in your body.  · A depressed mood.  · Difficulty sleeping (insomnia).  After the first 2-3 weeks of quitting, you may start to notice more positive results, such as:  · Improved sense of smell and taste.  · Decreased coughing and sore throat.  · Slower heart rate.  · Lower blood pressure.  · Clearer skin.  · The ability to breathe more easily.  · Fewer sick days.  Quitting smoking is very challenging for most people. Do not get discouraged if you are not successful the first time. Some people need to make many attempts to quit before they achieve long-term success. Do your best to stick to your quit plan, and talk with your health care provider if you have any questions or concerns.  This information is not intended to replace advice given to you by your health care provider. Make sure you discuss any questions you have with your health care provider.  Document Released: 12/12/2002 Document Revised: 08/15/2017 Document Reviewed: 05/03/2016  [employer] Patient Education © 2017 [employer]    Managing the Challenge of Quitting Smoking  Quitting smoking is a physical and mental challenge. You will face cravings, withdrawal symptoms, and temptation. Before quitting, work with your health care provider to make a plan that can help you manage quitting. Preparation can help you quit and keep you from giving in.  How to manage lifestyle changes  Managing stress  Stress can make you want to smoke, and wanting to smoke may cause stress. It is important to find ways to manage your stress. You might try some of the following:  · Practice relaxation techniques.  ? Breathe slowly and deeply, in through your nose and out through your mouth.  ? Listen to music.  ? Soak in a bath or take a shower.  ? Imagine a peaceful place or vacation.  · Get some support.  ? Talk with family or friends about your stress.  ? Join a support group.  ? Talk with a counselor or  therapist.  · Get some physical activity.  ? Go for a walk, run, or bike ride.  ? Play a favorite sport.  ? Practice yoga.    Medicines  Talk with your health care provider about medicines that might help you deal with cravings and make quitting easier for you.  Relationships  Social situations can be difficult when you are quitting smoking. To manage this, you can:  · Avoid parties and other social situations where people might be smoking.  · Avoid alcohol.  · Leave right away if you have the urge to smoke.  · Explain to your family and friends that you are quitting smoking. Ask for support and let them know you might be a bit grumpy.  · Plan activities where smoking is not an option.  General instructions  Be aware that many people gain weight after they quit smoking. However, not everyone does. To keep from gaining weight, have a plan in place before you quit and stick to the plan after you quit. Your plan should include:  · Having healthy snacks. When you have a craving, it may help to:  ? Eat popcorn, carrots, celery, or other cut vegetables.  ? Chew sugar-free gum.  · Changing how you eat.  ? Eat small portion sizes at meals.  ? Eat 4-6 small meals throughout the day instead of 1-2 large meals a day.  ? Be mindful when you eat. Do not watch television or do other things that might distract you as you eat.  · Exercising regularly.  ? Make time to exercise each day. If you do not have time for a long workout, do short bouts of exercise for 5-10 minutes several times a day.  ? Do some form of strengthening exercise, such as weight lifting.  ? Do some exercise that gets your heart beating and causes you to breathe deeply, such as walking fast, running, swimming, or biking. This is very important.  · Drinking plenty of water or other low-calorie or no-calorie drinks. Drink 6-8 glasses of water daily.    How to recognize withdrawal symptoms  Your body and mind may experience discomfort as you try to get used to not  having nicotine in your system. These effects are called withdrawal symptoms. They may include:  · Feeling hungrier than normal.  · Having trouble concentrating.  · Feeling irritable or restless.  · Having trouble sleeping.  · Feeling depressed.  · Craving a cigarette.  To manage withdrawal symptoms:  · Avoid places, people, and activities that trigger your cravings.  · Remember why you want to quit.  · Get plenty of sleep.  · Avoid coffee and other caffeinated drinks. These may worsen some of your symptoms.  These symptoms may surprise you. But be assured that they are normal to have when quitting smoking.  How to manage cravings  Come up with a plan for how to deal with your cravings. The plan should include the following:  · A definition of the specific situation you want to deal with.  · An alternative action you will take.  · A clear idea for how this action will help.  · The name of someone who might help you with this.  Cravings usually last for 5-10 minutes. Consider taking the following actions to help you with your plan to deal with cravings:  · Keep your mouth busy.  ? Chew sugar-free gum.  ? Suck on hard candies or a straw.  ? Brush your teeth.  · Keep your hands and body busy.  ? Change to a different activity right away.  ? Squeeze or play with a ball.  ? Do an activity or a hobby, such as making bead jewelry, practicing needlepoint, or working with wood.  ? Mix up your normal routine.  ? Take a short exercise break. Go for a quick walk or run up and down stairs.  · Focus on doing something kind or helpful for someone else.  · Call a friend or family member to talk during a craving.  · Join a support group.  · Contact a quitline.  Where to find support  To get help or find a support group:  · Call the National Cancer [place] Smoking Quitline: 1-800-QUIT NOW (814-7957)  · Visit the website of the Substance Abuse and Mental Health Services Administration: www.samhsa.gov  · Text QUIT to SmokefreeTXT:  176678  Where to find more information  Visit these websites to find more information on quitting smoking:  · National Cancer [place]: www.smokefree.gov  · American Lung Association: www.lung.org  · American Cancer Society: www.cancer.org  · Centers for Disease Control and Prevention: www.cdc.gov  · American Heart Association: www.heart.org  Contact a health care provider if:  · You want to change your plan for quitting.  · The medicines you are taking are not helping.  · Your eating feels out of control or you cannot sleep.  Get help right away if:  · You feel depressed or become very anxious.  Summary  · Quitting smoking is a physical and mental challenge. You will face cravings, withdrawal symptoms, and temptation to smoke again. Preparation can help you as you go through these challenges.  · Try different techniques to manage stress, handle social situations, and prevent weight gain.  · You can deal with cravings by keeping your mouth busy (such as by chewing gum), keeping your hands and body busy, calling family or friends, or contacting a quitline for people who want to quit smoking.  · You can deal with withdrawal symptoms by avoiding places where people smoke, getting plenty of rest, and avoiding drinks with caffeine.  This information is not intended to replace advice given to you by your health care provider. Make sure you discuss any questions you have with your health care provider.  Document Revised: 10/06/2020 Document Reviewed: 10/06/2020  [employer] Patient Education © 2021 [employer]    Prediabetes Eating Plan  Prediabetes is a condition that causes blood sugar (glucose) levels to be higher than normal. This increases the risk for developing type 2 diabetes (type 2 diabetes mellitus). Working with a health care provider or nutrition specialist (dietitian) to make diet and lifestyle changes can help prevent the onset of diabetes. These changes may help you:  · Control your blood glucose  levels.  · Improve your cholesterol levels.  · Manage your blood pressure.  What are tips for following this plan?  Reading food labels  · Read food labels to check the amount of fat, salt (sodium), and sugar in prepackaged foods. Avoid foods that have:  ? Saturated fats.  ? Trans fats.  ? Added sugars.  · Avoid foods that have more than 300 milligrams (mg) of sodium per serving. Limit your sodium intake to less than 2,300 mg each day.  Shopping  · Avoid buying pre-made and processed foods.  · Avoid buying drinks with added sugar.  Cooking  · Cook with olive oil. Do not use butter, lard, or ghee.  · Bake, broil, grill, steam, or boil foods. Avoid frying.  Meal planning    · Work with your dietitian to create an eating plan that is right for you. This may include tracking how many calories you take in each day. Use a food diary, notebook, or mobile application to track what you eat at each meal.  · Consider following a Mediterranean diet. This includes:  ? Eating several servings of fresh fruits and vegetables each day.  ? Eating fish at least twice a week.  ? Eating one serving each day of whole grains, beans, nuts, and seeds.  ? Using olive oil instead of other fats.  ? Limiting alcohol.  ? Limiting red meat.  ? Using nonfat or low-fat dairy products.  · Consider following a plant-based diet. This includes dietary choices that focus on eating mostly vegetables and fruit, grains, beans, nuts, and seeds.  · If you have high blood pressure, you may need to limit your sodium intake or follow a diet such as the DASH (Dietary Approaches to Stop Hypertension) eating plan. The DASH diet aims to lower high blood pressure.    Lifestyle  · Set weight loss goals with help from your health care team. It is recommended that most people with prediabetes lose 7% of their body weight.  · Exercise for at least 30 minutes 5 or more days a week.  · Attend a support group or seek support from a mental health counselor.  · Take  over-the-counter and prescription medicines only as told by your health care provider.  What foods are recommended?  Fruits  Berries. Bananas. Apples. Oranges. Grapes. Papaya. [place]. Pomegranate. Kiwi. Grapefruit. Cherries.  Vegetables  Lettuce. Spinach. Peas. Beets. Cauliflower. Cabbage. Broccoli. Carrots. Tomatoes. Squash. Eggplant. Herbs. Peppers. Onions. Cucumbers. [place] sprouts.  Grains  Whole grains, such as whole-wheat or whole-grain breads, crackers, cereals, and pasta. Unsweetened oatmeal. Bulgur. Barley. Quinoa. Brown rice. Corn or whole-wheat flour tortillas or taco shells.  Meats and other proteins  Seafood. Poultry without skin. Lean cuts of pork and beef. Tofu. Eggs. Nuts. Beans.  Dairy  Low-fat or fat-free dairy products, such as yogurt, cottage cheese, and cheese.  Beverages  Water. Tea. Coffee. Sugar-free or diet soda. [place] water. Low-fat or nonfat milk. Milk alternatives, such as soy or almond milk.  Fats and oils  Olive oil. Canola oil. Sunflower oil. Grapeseed oil. Avocado. Walnuts.  Sweets and desserts  Sugar-free or low-fat pudding. Sugar-free or low-fat ice cream and other frozen treats.  Seasonings and condiments  Herbs. Sodium-free spices. Mustard. Relish. Low-salt, low-sugar ketchup. Low-salt, low-sugar barbecue sauce. Low-fat or fat-free mayonnaise.  The items listed above may not be a complete list of recommended foods and beverages. Contact a dietitian for more information.  What foods are not recommended?  Fruits  Fruits canned with syrup.  Vegetables  Canned vegetables. Frozen vegetables with butter or cream sauce.  Grains  Refined white flour and flour products, such as bread, pasta, snack foods, and cereals.  Meats and other proteins  Fatty cuts of meat. Poultry with skin. Breaded or fried meat. Processed meats.  Dairy  Full-fat yogurt, cheese, or milk.  Beverages  Sweetened drinks, such as iced tea and soda.  Fats and oils  Butter. Lard. Ghee.  Sweets and desserts  Baked  goods, such as cake, cupcakes, pastries, cookies, and cheesecake.  Seasonings and condiments  Spice mixes with added salt. Ketchup. Barbecue sauce. Mayonnaise.  The items listed above may not be a complete list of foods and beverages that are not recommended. Contact a dietitian for more information.  Where to find more information  · American Diabetes Association: www.diabetes.org  Summary  · You may need to make diet and lifestyle changes to help prevent the onset of diabetes. These changes can help you control blood sugar, improve cholesterol levels, and manage blood pressure.  · Set weight loss goals with help from your health care team. It is recommended that most people with prediabetes lose 7% of their body weight.  · Consider following a Mediterranean diet. This includes eating plenty of fresh fruits and vegetables, whole grains, beans, nuts, seeds, fish, and low-fat dairy, and using olive oil instead of other fats.  This information is not intended to replace advice given to you by your health care provider. Make sure you discuss any questions you have with your health care provider.  Document Revised: 03/18/2021 Document Reviewed: 03/18/2021  [employer] Patient Education © 2021 [employer]    Preventing Type 2 Diabetes Mellitus  Type 2 diabetes, also called type 2 diabetes mellitus, is a long-term (chronic) disease that affects sugar (glucose) levels in your blood. Normally, a hormone called insulin allows glucose to enter cells in your body. The cells use glucose for energy. With type 2 diabetes, you will have one or both of these problems:  · Your pancreas does not make enough insulin.  · Cells in your body do not respond properly to insulin that your body makes (insulin resistance).  Insulin resistance or lack of insulin causes extra glucose to build up in the blood instead of going into cells. As a result, high blood glucose (hyperglycemia) develops. That can cause many complications. Being overweight  or obese and having an inactive (sedentary) lifestyle can increase your risk for diabetes. Type 2 diabetes can be delayed or prevented by making certain nutrition and lifestyle changes.  How can this condition affect me?  If you do not take steps to prevent diabetes, your blood glucose levels may keep increasing over time. Too much glucose in your blood for a long time can damage your blood vessels, heart, kidneys, nerves, and eyes.  Type 2 diabetes can lead to chronic health problems and complications, such as:  · Heart disease.  · Stroke.  · Blindness.  · Kidney disease.  · Depression.  · Poor circulation in your feet and legs. In severe cases, a foot or leg may need to be surgically removed (amputated).  What can increase my risk?  You may be more likely to develop type 2 diabetes if you:  · Have type 2 diabetes in your family.  · Are overweight or obese.  · Have a sedentary lifestyle.  · Have insulin resistance or a history of prediabetes.  · Have a history of pregnancy-related (gestational) diabetes or polycystic ovary syndrome (PCOS).  What actions can I take to prevent this?  It can be difficult to recognize signs of type 2 diabetes. Taking action to prevent the disease before you develop symptoms is the best way to avoid possible damage to your body. Making certain nutrition and lifestyle changes may prevent or delay the disease and related health problems.  Nutrition    · Eat healthy meals and snacks regularly. Do not skip meals. Fruit or a handful of nuts is a healthy snack between meals.  · Drink water throughout the day. Avoid drinks that contain added sugar, such as soda or sweetened tea. Drink enough fluid to keep your urine pale yellow.  · Follow instructions from your health care provider about eating or drinking restrictions.  · Limit the amount of food you eat by:  ? Controlling how much you eat at a time (portion size).  ? Checking food labels for the serving sizes of food.  ? Using a kitchen  scale to weigh amounts of food.  · Sauté or steam food instead of frying it. Cook with water or broth instead of oils or butter.  · Limit saturated fat and salt (sodium) in your diet. Have no more than 1 tsp (2,400 mg) of sodium a day. If you have heart disease or high blood pressure, use less than ½?¾ tsp (1,500 mg) of sodium a day.    Lifestyle    · Lose weight if needed and as told. Your health care provider can determine how much weight loss is best for you and can help you lose weight safely.  · If you are overweight or obese, you may be told to lose at least 5?7% of your body weight.  · Manage blood pressure, cholesterol, and stress. Your health care provider will help determine the best treatment for you.  · Do not use any products that contain nicotine or tobacco, such as cigarettes, e-cigarettes, and chewing tobacco. If you need help quitting, ask your health care provider.    Activity    · Do physical activity that makes your heart beat faster and makes you sweat (moderate intensity). Do this for at least 30 minutes on at least 5 days of the week, or as much as told by your health care provider.  · Ask your health care provider what activities are safe for you. A mix of activities may be best, such as walking, swimming, cycling, and strength training.  · Try to add physical activity into your day. For example:  ? Park your car farther away than usual so that you walk more.  ? Take a walk during your lunch break.  ? Use stairs instead of elevators or escalators.  ? Walk or bike to work instead of driving.    Alcohol use  If you drink alcohol:  · Limit how much you use to:  ? 0?1 drink a day for women who are not pregnant.  ? 0?2 drinks a day for men.  · Be aware of how much alcohol is in your drink. In the [place] one drink equals one 12 oz bottle of beer (355 mL), one 5 oz glass of wine (148 mL), or one 1½ oz glass of hard liquor (44 mL).  General information  · Talk with your health care provider about your  risk factors and how you can reduce your risk for diabetes.  · Have your blood glucose tested regularly, as told by your health care provider.  · Get screening tests as told by your health care provider. You may have these regularly, especially if you have certain risk factors for type 2 diabetes.  · Make an appointment with a registered dietitian. This diet and nutrition specialist can help you make a healthy eating plan and help you understand portion sizes and food labels.  Where to find support  · Ask your health care provider to recommend a registered dietitian, a certified diabetes care and , or a weight loss program.  · Look for local or online weight loss groups.  · Join a gym, fitness club, or outdoor activity group, such as a walking club.  Where to find more information  To learn more about diabetes and diabetes prevention, visit:  · American Diabetes Association (ADA): www.diabetes.org  · National [place] of Diabetes and Digestive and Kidney Diseases: www.niddk.nih.gov  To learn more about healthy eating, visit:  · U.[place]. Department of Agriculture ([place]): www.Entellium.gov  · Office of Disease Prevention and Health Promotion (ODPHP): health.gov  Summary  · You can delay or prevent type 2 diabetes by eating healthy foods, losing weight if needed, and increasing your physical activity.  · Talk with your health care provider about your risk factors for type 2 diabetes and how you can reduce your risk.  · It can be difficult to recognize the signs of type 2 diabetes. The best way to avoid possible damage to your body is to take action to prevent the disease before you develop symptoms.  · Get screening tests as told by your health care provider.  This information is not intended to replace advice given to you by your health care provider. Make sure you discuss any questions you have with your health care provider.  Document Revised: 07/14/2021 Document Reviewed: 07/14/2021  [place]  Patient Education © 2021 [employer] Inc.

## 2021-11-03 ENCOUNTER — HOSPITAL ENCOUNTER (OUTPATIENT)
Dept: GENERAL RADIOLOGY | Facility: HOSPITAL | Age: 51
Discharge: HOME | End: 2021-11-03
Admitting: NURSE PRACTITIONER

## 2021-11-03 DIAGNOSIS — G89.29 CHRONIC PAIN OF TOE OF RIGHT FOOT: ICD-10-CM

## 2021-11-03 DIAGNOSIS — S90.414A ABRASION OF SECOND TOE OF RIGHT FOOT, INITIAL ENCOUNTER: ICD-10-CM

## 2021-11-03 DIAGNOSIS — Z98.890 HISTORY OF BUNIONECTOMY OF RIGHT GREAT TOE: ICD-10-CM

## 2021-11-03 DIAGNOSIS — M79.674 CHRONIC PAIN OF TOE OF RIGHT FOOT: ICD-10-CM

## 2021-11-03 PROCEDURE — 73630 X-RAY EXAM OF FOOT: CPT

## 2021-11-04 NOTE — TELEPHONE ENCOUNTER
PA for Rx Tudorza denied by pt's insurance. Insurance will cover Rx Anoro, Atrovent HFA, Bevespi, Combivent Respimat, Spiriva Handihaler, or Stiolto Respimat. Please advise.

## 2021-11-15 ENCOUNTER — HOSPITAL ENCOUNTER (OUTPATIENT)
Dept: CARDIOLOGY | Facility: HOSPITAL | Age: 51
Discharge: HOME | End: 2021-11-15
Admitting: NURSE PRACTITIONER

## 2021-11-15 ENCOUNTER — HOSPITAL ENCOUNTER (OUTPATIENT)
Dept: CARDIOLOGY | Facility: HOSPITAL | Age: 51
Discharge: HOME | End: 2021-11-15

## 2021-11-15 VITALS — WEIGHT: 186 LBS | HEIGHT: 65 IN | BODY MASS INDEX: 30.99 KG/M2

## 2021-11-15 VITALS — BODY MASS INDEX: 29.9 KG/M2 | WEIGHT: 186.07 LBS | HEIGHT: 66 IN

## 2021-11-15 DIAGNOSIS — R06.09 DOE (DYSPNEA ON EXERTION): ICD-10-CM

## 2021-11-15 LAB
BH CV ECHO MEAS - AO MAX PG (FULL): 1.3 MMHG
BH CV ECHO MEAS - AO MAX PG: 3.9 MMHG
BH CV ECHO MEAS - AO MEAN PG (FULL): 0.5 MMHG
BH CV ECHO MEAS - AO MEAN PG: 1.9 MMHG
BH CV ECHO MEAS - AO ROOT AREA (BSA CORRECTED): 1.4
BH CV ECHO MEAS - AO ROOT AREA: 5.4 CM^2
BH CV ECHO MEAS - AO ROOT DIAM: 2.6 CM
BH CV ECHO MEAS - AO V2 MAX: 98.4 CM/SEC
BH CV ECHO MEAS - AO V2 MEAN: 63.6 CM/SEC
BH CV ECHO MEAS - AO V2 VTI: 18.1 CM
BH CV ECHO MEAS - ASC AORTA: 2.7 CM
BH CV ECHO MEAS - AVA(I,A): 3 CM^2
BH CV ECHO MEAS - AVA(I,D): 3 CM^2
BH CV ECHO MEAS - AVA(V,A): 2.6 CM^2
BH CV ECHO MEAS - AVA(V,D): 2.6 CM^2
BH CV ECHO MEAS - BSA(HAYCOCK): 2 M^2
BH CV ECHO MEAS - BSA: 1.9 M^2
BH CV ECHO MEAS - BZI_BMI: 31 KILOGRAMS/M^2
BH CV ECHO MEAS - BZI_METRIC_HEIGHT: 165.1 CM
BH CV ECHO MEAS - BZI_METRIC_WEIGHT: 84.4 KG
BH CV ECHO MEAS - EDV(CUBED): 94.1 ML
BH CV ECHO MEAS - EDV(MOD-SP2): 73 ML
BH CV ECHO MEAS - EDV(MOD-SP4): 87 ML
BH CV ECHO MEAS - EDV(TEICH): 94.8 ML
BH CV ECHO MEAS - EF(CUBED): 67.6 %
BH CV ECHO MEAS - EF(MOD-BP): 57 %
BH CV ECHO MEAS - EF(MOD-SP2): 56.2 %
BH CV ECHO MEAS - EF(MOD-SP4): 56.3 %
BH CV ECHO MEAS - EF(TEICH): 59.2 %
BH CV ECHO MEAS - ESV(CUBED): 30.5 ML
BH CV ECHO MEAS - ESV(MOD-SP2): 32 ML
BH CV ECHO MEAS - ESV(MOD-SP4): 38 ML
BH CV ECHO MEAS - ESV(TEICH): 38.7 ML
BH CV ECHO MEAS - FS: 31.3 %
BH CV ECHO MEAS - IVS/LVPW: 1.3
BH CV ECHO MEAS - IVSD: 0.85 CM
BH CV ECHO MEAS - LA DIMENSION: 2.9 CM
BH CV ECHO MEAS - LA/AO: 1.1
BH CV ECHO MEAS - LAD MAJOR: 5.2 CM
BH CV ECHO MEAS - LAT PEAK E' VEL: 7.5 CM/SEC
BH CV ECHO MEAS - LATERAL E/E' RATIO: 6.6
BH CV ECHO MEAS - LV DIASTOLIC VOL/BSA (35-75): 45.4 ML/M^2
BH CV ECHO MEAS - LV IVRT: 0.07 SEC
BH CV ECHO MEAS - LV MASS(C)D: 106.9 GRAMS
BH CV ECHO MEAS - LV MASS(C)DI: 55.7 GRAMS/M^2
BH CV ECHO MEAS - LV MAX PG: 2.6 MMHG
BH CV ECHO MEAS - LV MEAN PG: 1.4 MMHG
BH CV ECHO MEAS - LV SYSTOLIC VOL/BSA (12-30): 19.8 ML/M^2
BH CV ECHO MEAS - LV V1 MAX: 80.2 CM/SEC
BH CV ECHO MEAS - LV V1 MEAN: 55.7 CM/SEC
BH CV ECHO MEAS - LV V1 VTI: 17.3 CM
BH CV ECHO MEAS - LVIDD: 4.5 CM
BH CV ECHO MEAS - LVIDS: 3.1 CM
BH CV ECHO MEAS - LVLD AP2: 7.6 CM
BH CV ECHO MEAS - LVLD AP4: 7.9 CM
BH CV ECHO MEAS - LVLS AP2: 6.9 CM
BH CV ECHO MEAS - LVLS AP4: 6.6 CM
BH CV ECHO MEAS - LVOT AREA (M): 3.1 CM^2
BH CV ECHO MEAS - LVOT AREA: 3.2 CM^2
BH CV ECHO MEAS - LVOT DIAM: 2 CM
BH CV ECHO MEAS - LVPWD: 0.66 CM
BH CV ECHO MEAS - MED PEAK E' VEL: 6.3 CM/SEC
BH CV ECHO MEAS - MEDIAL E/E' RATIO: 7.9
BH CV ECHO MEAS - MV A MAX VEL: 60 CM/SEC
BH CV ECHO MEAS - MV DEC TIME: 0.21 SEC
BH CV ECHO MEAS - MV E MAX VEL: 50.4 CM/SEC
BH CV ECHO MEAS - MV E/A: 0.84
BH CV ECHO MEAS - PA ACC SLOPE: 600.8 CM/SEC^2
BH CV ECHO MEAS - PA ACC TIME: 0.1 SEC
BH CV ECHO MEAS - PA PR(ACCEL): 34.6 MMHG
BH CV ECHO MEAS - SI(AO): 51.3 ML/M^2
BH CV ECHO MEAS - SI(CUBED): 33.1 ML/M^2
BH CV ECHO MEAS - SI(LVOT): 28.4 ML/M^2
BH CV ECHO MEAS - SI(MOD-SP2): 21.4 ML/M^2
BH CV ECHO MEAS - SI(MOD-SP4): 25.5 ML/M^2
BH CV ECHO MEAS - SI(TEICH): 29.3 ML/M^2
BH CV ECHO MEAS - SV(AO): 98.4 ML
BH CV ECHO MEAS - SV(CUBED): 63.6 ML
BH CV ECHO MEAS - SV(LVOT): 54.5 ML
BH CV ECHO MEAS - SV(MOD-SP2): 41 ML
BH CV ECHO MEAS - SV(MOD-SP4): 49 ML
BH CV ECHO MEAS - SV(TEICH): 56.1 ML
BH CV ECHO MEAS - TAPSE (>1.6): 1.7 CM
BH CV ECHO MEASUREMENTS AVERAGE E/E' RATIO: 7.3
BH CV VAS BP LEFT ARM: NORMAL MMHG
BH CV XLRA - RV BASE: 3 CM
BH CV XLRA - RV LENGTH: 5.6 CM
BH CV XLRA - RV MID: 2.9 CM
BH CV XLRA - TDI S': 6.54 CM/SEC
LEFT ATRIUM VOLUME INDEX: 21.9 ML/M^2
LEFT ATRIUM VOLUME: 42 ML
MAXIMAL PREDICTED HEART RATE: 170 BPM
STRESS TARGET HR: 145 BPM

## 2021-11-15 PROCEDURE — 93306 TTE W/DOPPLER COMPLETE: CPT

## 2021-11-15 PROCEDURE — 93306 TTE W/DOPPLER COMPLETE: CPT | Performed by: INTERNAL MEDICINE

## 2021-11-16 ENCOUNTER — HOSPITAL ENCOUNTER (OUTPATIENT)
Dept: CARDIOLOGY | Facility: HOSPITAL | Age: 51
Discharge: HOME | End: 2021-11-16
Admitting: NURSE PRACTITIONER

## 2021-11-16 VITALS
SYSTOLIC BLOOD PRESSURE: 144 MMHG | BODY MASS INDEX: 31 KG/M2 | HEIGHT: 65 IN | HEART RATE: 71 BPM | DIASTOLIC BLOOD PRESSURE: 82 MMHG | WEIGHT: 186.07 LBS

## 2021-11-16 LAB
BH CV REST NUCLEAR ISOTOPE DOSE: 30 MCI
BH CV STRESS BP STAGE 1: NORMAL
BH CV STRESS BP STAGE 3: NORMAL
BH CV STRESS BP STAGE 4: NORMAL
BH CV STRESS COMMENTS STAGE 1: NORMAL
BH CV STRESS DOSE REGADENOSON STAGE 1: 0.4
BH CV STRESS DURATION MIN STAGE 1: 1
BH CV STRESS DURATION MIN STAGE 2: 1
BH CV STRESS DURATION MIN STAGE 3: 1
BH CV STRESS DURATION MIN STAGE 4: 1
BH CV STRESS DURATION SEC STAGE 1: 0
BH CV STRESS DURATION SEC STAGE 2: 0
BH CV STRESS DURATION SEC STAGE 3: 0
BH CV STRESS DURATION SEC STAGE 4: 0
BH CV STRESS HR STAGE 1: 85
BH CV STRESS HR STAGE 2: 85
BH CV STRESS HR STAGE 3: 87
BH CV STRESS HR STAGE 4: 83
BH CV STRESS NUCLEAR ISOTOPE DOSE: 33 MCI
BH CV STRESS O2 STAGE 1: 97
BH CV STRESS O2 STAGE 2: 100
BH CV STRESS O2 STAGE 3: 99
BH CV STRESS O2 STAGE 4: 99
BH CV STRESS PROTOCOL 1: NORMAL
BH CV STRESS RECOVERY BP: NORMAL MMHG
BH CV STRESS RECOVERY HR: 84 BPM
BH CV STRESS RECOVERY O2: 96 %
BH CV STRESS STAGE 1: 1
BH CV STRESS STAGE 2: 2
BH CV STRESS STAGE 3: 3
BH CV STRESS STAGE 4: 4
MAXIMAL PREDICTED HEART RATE: 170 BPM
PERCENT MAX PREDICTED HR: 51.76 %
STRESS BASELINE BP: NORMAL MMHG
STRESS BASELINE HR: 70 BPM
STRESS O2 SAT REST: 98 %
STRESS PERCENT HR: 61 %
STRESS POST ESTIMATED WORKLOAD: 1 METS
STRESS POST EXERCISE DUR MIN: 4 MIN
STRESS POST EXERCISE DUR SEC: 0 SEC
STRESS POST O2 SAT PEAK: 99 %
STRESS POST PEAK BP: NORMAL MMHG
STRESS POST PEAK HR: 88 BPM
STRESS TARGET HR: 145 BPM

## 2021-11-16 PROCEDURE — 25010000002 REGADENOSON 0.4 MG/5ML SOLUTION: Performed by: NURSE PRACTITIONER

## 2021-11-16 PROCEDURE — 93018 CV STRESS TEST I&R ONLY: CPT | Performed by: INTERNAL MEDICINE

## 2021-11-16 PROCEDURE — 78492 MYOCRD IMG PET MLT RST&STRS: CPT

## 2021-11-16 PROCEDURE — 93017 CV STRESS TEST TRACING ONLY: CPT

## 2021-11-16 PROCEDURE — 78492 MYOCRD IMG PET MLT RST&STRS: CPT | Performed by: INTERNAL MEDICINE

## 2021-11-16 PROCEDURE — A9555 RB82 RUBIDIUM: HCPCS | Performed by: NURSE PRACTITIONER

## 2021-11-16 PROCEDURE — 0 RUBIDIUM CHLORIDE: Performed by: NURSE PRACTITIONER

## 2021-11-16 RX ADMIN — REGADENOSON 0.4 MG: 0.08 INJECTION, SOLUTION INTRAVENOUS at 12:03

## 2021-11-16 RX ADMIN — RUBIDIUM CHLORIDE RB-82 1 DOSE: 150 INJECTION, SOLUTION INTRAVENOUS at 12:06

## 2021-11-16 RX ADMIN — RUBIDIUM CHLORIDE RB-82 1 DOSE: 150 INJECTION, SOLUTION INTRAVENOUS at 11:55

## 2021-11-29 ENCOUNTER — APPOINTMENT (OUTPATIENT)
Dept: CT IMAGING | Facility: HOSPITAL | Age: 51
End: 2021-11-29

## 2022-02-06 DIAGNOSIS — J45.20 MILD INTERMITTENT ASTHMA WITHOUT COMPLICATION: ICD-10-CM

## 2022-02-06 DIAGNOSIS — F17.200 CURRENT SMOKER: ICD-10-CM

## 2022-02-08 RX ORDER — ALBUTEROL SULFATE 90 UG/1
AEROSOL, METERED RESPIRATORY (INHALATION)
Qty: 18 G | Refills: 5 | Status: SHIPPED | OUTPATIENT
Start: 2022-02-08 | End: 2023-03-09 | Stop reason: SDUPTHER

## 2022-05-03 ENCOUNTER — TRANSCRIBE ORDERS (OUTPATIENT)
Dept: ADMINISTRATIVE | Facility: HOSPITAL | Age: 52
End: 2022-05-03

## 2022-05-03 ENCOUNTER — APPOINTMENT (OUTPATIENT)
Dept: MAMMOGRAPHY | Facility: HOSPITAL | Age: 52
End: 2022-05-03

## 2022-05-03 DIAGNOSIS — R92.8 ABNORMAL MAMMOGRAM: Primary | ICD-10-CM

## 2022-05-24 DIAGNOSIS — G25.81 RESTLESS LEG SYNDROME: ICD-10-CM

## 2022-05-24 DIAGNOSIS — F51.01 PRIMARY INSOMNIA: ICD-10-CM

## 2022-05-24 DIAGNOSIS — F33.2 SEVERE EPISODE OF RECURRENT MAJOR DEPRESSIVE DISORDER, WITHOUT PSYCHOTIC FEATURES: ICD-10-CM

## 2022-05-24 RX ORDER — ATORVASTATIN CALCIUM 80 MG/1
TABLET, FILM COATED ORAL
Qty: 90 TABLET | Refills: 0 | Status: SHIPPED | OUTPATIENT
Start: 2022-05-24 | End: 2023-03-09 | Stop reason: SDUPTHER

## 2022-05-24 RX ORDER — TRAZODONE HYDROCHLORIDE 100 MG/1
TABLET ORAL
Qty: 90 TABLET | Refills: 1 | Status: SHIPPED | OUTPATIENT
Start: 2022-05-24

## 2022-05-24 RX ORDER — ROPINIROLE 1 MG/1
TABLET, FILM COATED ORAL
Qty: 60 TABLET | Refills: 0 | Status: SHIPPED | OUTPATIENT
Start: 2022-05-24

## 2022-05-24 NOTE — TELEPHONE ENCOUNTER
Rx Refill Note  Requested Prescriptions     Pending Prescriptions Disp Refills   • atorvastatin (LIPITOR) 80 MG tablet [Pharmacy Med Name: ATORVASTATIN 80 MG TABLET] 90 tablet 0     Sig: TAKE ONE TABLET BY MOUTH ONCE NIGHTLY   • rOPINIRole (REQUIP) 1 MG tablet [Pharmacy Med Name: rOPINIRole HCL 1 MG TABLET] 60 tablet 0     Sig: TAKE TWO TABLETS BY MOUTH ONCE NIGHTLY ONE HOUR BEFORE BEDTIME **MUST CALL MD FOR APPOINTMENT   • traZODone (DESYREL) 100 MG tablet [Pharmacy Med Name: traZODone 100 MG TABLET] 90 tablet 1     Sig: TAKE ONE TABLET BY MOUTH ONCE NIGHTLY      Last office visit with prescribing clinician: 11/2/2021      Next office visit with prescribing clinician: Visit date not found            Neeru Dia MA  05/24/22, 15:37 EDT

## 2023-03-09 DIAGNOSIS — F17.200 CURRENT SMOKER: ICD-10-CM

## 2023-03-09 DIAGNOSIS — F33.2 SEVERE EPISODE OF RECURRENT MAJOR DEPRESSIVE DISORDER, WITHOUT PSYCHOTIC FEATURES: ICD-10-CM

## 2023-03-09 DIAGNOSIS — J45.20 MILD INTERMITTENT ASTHMA WITHOUT COMPLICATION: ICD-10-CM

## 2023-03-09 DIAGNOSIS — F41.9 ANXIETY: ICD-10-CM

## 2023-03-09 NOTE — TELEPHONE ENCOUNTER
Caller: [facility], [place] - [address] [phone] [phone] FX    Relationship: Pharmacy    Best call back number: [phone]    Requested Prescriptions:   Requested Prescriptions     Pending Prescriptions Disp Refills   • albuterol sulfate  (90 Base) MCG/ACT inhaler 18 g 5     Sig: Inhale 2 puffs Every 4 (Four) Hours As Needed for Wheezing or Shortness of Air.   • atorvastatin (LIPITOR) 80 MG tablet 90 tablet 0     Sig: Take 1 tablet by mouth Every Night.   • busPIRone (BUSPAR) 7.5 MG tablet 270 tablet 1     Sig: Take 1 tablet by mouth 3 (Three) Times a Day.   • FLUoxetine (PROzac) 20 MG capsule 270 capsule 1     Sig: Take 1 capsule by mouth 3 (Three) Times a Day.   • tiotropium (SPIRIVA) 18 MCG per inhalation capsule 30 capsule 5     Sig: Place 1 capsule into inhaler and inhale Daily.   • budesonide-formoterol (SYMBICORT) 160-4.5 MCG/ACT inhaler 10.2 g 3     Sig: Inhale 2 puffs 2 (Two) Times a Day.        Pharmacy where request should be sent: [facility], [address] [place] [id number] [facility] [phone] [facility] [phone] FX     Additional details provided by patient: PATIENT HAS CHANGED TO MAIL ORDER PHARMACY. ALSO NEEDS LISINOPRIL 20MG ONCE DAILY WHICH IS NOT ON MEDICATION LIST.     Does the patient have less than a 3 day supply:  [] Yes  [x] No    Would you like a call back once the refill request has been completed: [] Yes [x] No    If the office needs to give you a call back, can they leave a voicemail: [] Yes [x] No    Josephine Pastrana Rep   03/09/23 12:18 EST

## 2023-03-10 NOTE — TELEPHONE ENCOUNTER
Rx Refill Note  Requested Prescriptions     Pending Prescriptions Disp Refills   • albuterol sulfate  (90 Base) MCG/ACT inhaler 18 g 5     Sig: Inhale 2 puffs Every 4 (Four) Hours As Needed for Wheezing or Shortness of Air.   • atorvastatin (LIPITOR) 80 MG tablet 90 tablet 0     Sig: Take 1 tablet by mouth Every Night.   • busPIRone (BUSPAR) 7.5 MG tablet 270 tablet 1     Sig: Take 1 tablet by mouth 3 (Three) Times a Day.   • FLUoxetine (PROzac) 20 MG capsule 270 capsule 1     Sig: Take 1 capsule by mouth 3 (Three) Times a Day.   • tiotropium (SPIRIVA) 18 MCG per inhalation capsule 30 capsule 5     Sig: Place 1 capsule into inhaler and inhale Daily.   • budesonide-formoterol (SYMBICORT) 160-4.5 MCG/ACT inhaler 10.2 g 3     Sig: Inhale 2 puffs 2 (Two) Times a Day.      Last office visit with prescribing clinician: 11/2/2021   Last telemedicine visit with prescribing clinician: Visit date not found   Next office visit with prescribing clinician: Visit date not found                         Would you like a call back once the refill request has been completed: [] Yes [] No    If the office needs to give you a call back, can they leave a voicemail: [] Yes [] No    Chris Coffey MA  03/10/23, 11:26 EST      Patient was to Return in about 3 months (around 2/2/2022) for Recheck.  Called patient number is not in service    OK FOR HUB TO RELAY MESSAGE AND SCHEDULE FOLLOW UP APPOINTMENT

## 2023-03-13 NOTE — TELEPHONE ENCOUNTER
PHARMACY IS CALLING TO REQUEST THE MEDICATIONS AGAIN. HUB UNABLE TO LET THEM KNOW THAT THE PATIENT IS NEEDING AN APPOINTMENT.

## 2023-03-13 NOTE — TELEPHONE ENCOUNTER
2nd attempt     Patient was to Return in about 3 months (around 2/2/2022) for Recheck.  Called patient number is not in service     OK FOR HUB TO RELAY MESSAGE AND SCHEDULE FOLLOW UP APPOINTMENT

## 2023-03-14 DIAGNOSIS — F33.2 SEVERE EPISODE OF RECURRENT MAJOR DEPRESSIVE DISORDER, WITHOUT PSYCHOTIC FEATURES: ICD-10-CM

## 2023-03-14 DIAGNOSIS — F41.9 ANXIETY: ICD-10-CM

## 2023-03-14 RX ORDER — BUDESONIDE AND FORMOTEROL FUMARATE DIHYDRATE 160; 4.5 UG/1; UG/1
2 AEROSOL RESPIRATORY (INHALATION)
Qty: 10.2 G | Refills: 0 | Status: SHIPPED | OUTPATIENT
Start: 2023-03-14

## 2023-03-14 RX ORDER — ATORVASTATIN CALCIUM 80 MG/1
80 TABLET, FILM COATED ORAL NIGHTLY
Qty: 30 TABLET | Refills: 0 | Status: SHIPPED | OUTPATIENT
Start: 2023-03-14

## 2023-03-14 RX ORDER — FLUOXETINE HYDROCHLORIDE 20 MG/1
20 CAPSULE ORAL 2 TIMES DAILY
Qty: 60 CAPSULE | Refills: 0 | Status: SHIPPED | OUTPATIENT
Start: 2023-03-14

## 2023-03-14 RX ORDER — ALBUTEROL SULFATE 90 UG/1
2 AEROSOL, METERED RESPIRATORY (INHALATION) EVERY 4 HOURS PRN
Qty: 18 G | Refills: 0 | Status: SHIPPED | OUTPATIENT
Start: 2023-03-14

## 2023-03-14 RX ORDER — BUSPIRONE HYDROCHLORIDE 7.5 MG/1
7.5 TABLET ORAL 3 TIMES DAILY
Qty: 60 TABLET | Refills: 0 | Status: SHIPPED | OUTPATIENT
Start: 2023-03-14

## 2023-03-14 NOTE — TELEPHONE ENCOUNTER
3rd attempt     Patient was to Return in about 3 months (around 2/2/2022) for Recheck.  Called patient number is not in service     OK FOR HUB TO RELAY MESSAGE AND SCHEDULE FOLLOW UP APPOINTMENT

## 2023-03-15 ENCOUNTER — TELEPHONE (OUTPATIENT)
Dept: FAMILY MEDICINE CLINIC | Facility: CLINIC | Age: 53
End: 2023-03-15
Payer: COMMERCIAL

## 2023-03-15 DIAGNOSIS — J45.20 MILD INTERMITTENT ASTHMA WITHOUT COMPLICATION: ICD-10-CM

## 2023-03-15 DIAGNOSIS — F17.200 CURRENT SMOKER: ICD-10-CM

## 2023-03-15 RX ORDER — TIOTROPIUM BROMIDE 18 UG/1
CAPSULE ORAL; RESPIRATORY (INHALATION)
Qty: 30 CAPSULE | Refills: 10 | OUTPATIENT
Start: 2023-03-15

## 2023-03-15 RX ORDER — BUSPIRONE HYDROCHLORIDE 7.5 MG/1
TABLET ORAL
Qty: 60 TABLET | Refills: 10 | OUTPATIENT
Start: 2023-03-15

## 2023-03-15 RX ORDER — BUDESONIDE AND FORMOTEROL FUMARATE DIHYDRATE 160; 4.5 UG/1; UG/1
AEROSOL RESPIRATORY (INHALATION)
Qty: 10.2 G | Refills: 10 | OUTPATIENT
Start: 2023-03-15

## 2023-03-15 RX ORDER — ATORVASTATIN CALCIUM 80 MG/1
TABLET, FILM COATED ORAL
Qty: 30 TABLET | Refills: 10 | OUTPATIENT
Start: 2023-03-15

## 2023-03-15 RX ORDER — FLUOXETINE HYDROCHLORIDE 20 MG/1
CAPSULE ORAL
Qty: 60 CAPSULE | Refills: 10 | OUTPATIENT
Start: 2023-03-15

## 2023-03-15 NOTE — TELEPHONE ENCOUNTER
[facility], [place] - [address] [facility] [address] - [phone] [facility] [phone] FX    Relationship: Pharmacy    Best call back number: [phone]    Requested Prescriptions: LISINOPRIL     [facility] be sent[facility], [place] - [address] - [phone] [facility] [phone] FX     Additional details provided by patient: THE PATIENT REQUESTED THIS FROM THEM. IT WAS NOT SENT IN WITH HER OTHER REFILLS YESTERDAY. PLEASE SEND A NEW PRESCRIPTION IF SHE IS SUPPOSED TO BE TAKING THIS ONE.

## 2023-03-15 NOTE — TELEPHONE ENCOUNTER
Pt has not been seen since 11/2021 and Lisinopril is not on her meds  list     Attempted to contact patient # not in service.

## 2023-03-16 ENCOUNTER — TELEPHONE (OUTPATIENT)
Dept: FAMILY MEDICINE CLINIC | Facility: CLINIC | Age: 53
End: 2023-03-16
Payer: COMMERCIAL

## 2023-03-16 RX ORDER — ALBUTEROL SULFATE 90 UG/1
AEROSOL, METERED RESPIRATORY (INHALATION)
Qty: 18 G | Refills: 10 | OUTPATIENT
Start: 2023-03-16

## 2023-03-16 NOTE — TELEPHONE ENCOUNTER
Contacted pharmacy to get more information. Pharmacists states the pt is brand new to them and she has not had a past script of Lisinopril she had just mentioned all of her meds so they contacted our office for the request. Pharmacist gave me an updated number for the patient   [phone] and if the pt calls back they will notify her that she needs an appt. Attempted to contact pt, no answer. Unable to LVM,box not set up.

## 2023-03-17 NOTE — TELEPHONE ENCOUNTER
Received notification from insurance that Symbicort Is not covered under formulary plan, preferred alternatives are Advair Diskus, Advair HFA, Breo Ellipta, Dulera    Spiriva handihaler also not covered, covered alternative is Incruse Ellipta

## 2023-03-20 ENCOUNTER — TELEPHONE (OUTPATIENT)
Dept: FAMILY MEDICINE CLINIC | Facility: CLINIC | Age: 53
End: 2023-03-20
Payer: COMMERCIAL

## 2023-03-20 NOTE — TELEPHONE ENCOUNTER
Notified insurance plan that patient will need to follow up for continuing care once she is released

## 2023-03-20 NOTE — TELEPHONE ENCOUNTER
[facility] [place] [facility] [address] - [phone] [facility] [phone]      Pharmacy representative name: KEVIN    Pharmacy representative phone number: [phone]    What medication are you calling in regards to: SPIRIVA INHALER    What question does the pharmacy have: PHARMACY STATES THAT THEY SENT A REQUEST, VIA FAX ON 3/15/23, TO REQUEST THIS INHALER BE CHANGED TO INCRUSE, BUT HAVE NOT RECEIVED A RESPONSE YET.    PLEASE ADVISE    Who is the provider that prescribed the medication: IRAIDA BANEGAS    Additional notes: N/A

## 2023-04-25 DIAGNOSIS — F17.200 CURRENT SMOKER: ICD-10-CM

## 2023-04-25 DIAGNOSIS — F41.9 ANXIETY: ICD-10-CM

## 2023-04-25 DIAGNOSIS — J45.20 MILD INTERMITTENT ASTHMA WITHOUT COMPLICATION: ICD-10-CM

## 2023-04-25 DIAGNOSIS — F33.2 SEVERE EPISODE OF RECURRENT MAJOR DEPRESSIVE DISORDER, WITHOUT PSYCHOTIC FEATURES: ICD-10-CM

## 2023-04-25 NOTE — TELEPHONE ENCOUNTER
Caller: [facility]    Relationship: Other    Best call back number: [phone]    Requested Prescriptions:   Requested Prescriptions     Pending Prescriptions Disp Refills   • busPIRone (BUSPAR) 7.5 MG tablet 60 tablet 0     Sig: Take 1 tablet by mouth 3 (Three) Times a Day. Patient has not been seen since 2021, needs appt for refills   • atorvastatin (LIPITOR) 80 MG tablet 30 tablet 0     Sig: Take 1 tablet by mouth Every Night. Patient has not been seen since 2021, needs appt for refills   • FLUoxetine (PROzac) 20 MG capsule 60 capsule 0     Sig: Take 1 capsule by mouth 2 (Two) Times a Day. Patient has not been seen since 2021, needs appt for refills   • budesonide-formoterol (SYMBICORT) 160-4.5 MCG/ACT inhaler 10.2 g 0     Sig: Inhale 2 puffs 2 (Two) Times a Day.   • albuterol sulfate  (90 Base) MCG/ACT inhaler 18 g 0     Sig: Inhale 2 puffs Every 4 (Four) Hours As Needed for Wheezing or Shortness of Air.        Pharmacy where request should be sent: [address] [phone] [facility] [phone]      Last office visit with prescribing clinician: 11/2/2021   Last telemedicine visit with prescribing clinician: Visit date not found   Next office visit with prescribing clinician: Visit date not found     Additional details provided by patient: PATIENT POSSIBLY HAS 5-6 DAYS LEFT AND OUT OF REFILLS    Does the patient have less than a 3 day supply:  [x] Yes  [] No    Josephine Miramontes Rep   04/25/23 14:39 EDT     PLEASE CALL TO ADVISE

## 2023-04-26 RX ORDER — BUDESONIDE AND FORMOTEROL FUMARATE DIHYDRATE 160; 4.5 UG/1; UG/1
2 AEROSOL RESPIRATORY (INHALATION)
Qty: 10.2 G | Refills: 0 | OUTPATIENT
Start: 2023-04-26

## 2023-04-26 RX ORDER — FLUOXETINE HYDROCHLORIDE 20 MG/1
20 CAPSULE ORAL 2 TIMES DAILY
Qty: 60 CAPSULE | Refills: 0 | OUTPATIENT
Start: 2023-04-26

## 2023-04-26 RX ORDER — BUSPIRONE HYDROCHLORIDE 7.5 MG/1
7.5 TABLET ORAL 3 TIMES DAILY
Qty: 60 TABLET | Refills: 0 | OUTPATIENT
Start: 2023-04-26

## 2023-04-26 RX ORDER — ATORVASTATIN CALCIUM 80 MG/1
80 TABLET, FILM COATED ORAL NIGHTLY
Qty: 30 TABLET | Refills: 0 | OUTPATIENT
Start: 2023-04-26

## 2023-04-26 RX ORDER — ALBUTEROL SULFATE 90 UG/1
2 AEROSOL, METERED RESPIRATORY (INHALATION) EVERY 4 HOURS PRN
Qty: 18 G | Refills: 0 | OUTPATIENT
Start: 2023-04-26

## 2023-05-08 DIAGNOSIS — F33.2 SEVERE EPISODE OF RECURRENT MAJOR DEPRESSIVE DISORDER, WITHOUT PSYCHOTIC FEATURES: ICD-10-CM

## 2023-05-08 RX ORDER — ATORVASTATIN CALCIUM 80 MG/1
TABLET, FILM COATED ORAL
Qty: 30 TABLET | Refills: 10 | OUTPATIENT
Start: 2023-05-08

## 2023-05-09 RX ORDER — FLUOXETINE HYDROCHLORIDE 20 MG/1
CAPSULE ORAL
Qty: 60 CAPSULE | Refills: 10 | OUTPATIENT
Start: 2023-05-09

## 2023-05-11 DIAGNOSIS — F33.2 SEVERE EPISODE OF RECURRENT MAJOR DEPRESSIVE DISORDER, WITHOUT PSYCHOTIC FEATURES: ICD-10-CM

## 2023-05-11 NOTE — TELEPHONE ENCOUNTER
Rx Refill Note  Requested Prescriptions     Pending Prescriptions Disp Refills   • atorvastatin (LIPITOR) 80 MG tablet [Pharmacy Med Name: ATORVASTATIN 80 MG TABLET 80 Tablet] 30 tablet 10     Sig: TAKE 1 TABLET BY MOUTH NIGHTLY *PT NEEDS APPT* *EMERGENCY REFILL*   • FLUoxetine (PROzac) 20 MG capsule [Pharmacy Med Name: FLUOXETINE 20 MG CAP 20 Capsule] 60 capsule 10     Sig: TAKE 1 CAPSULE BY MOUTH TWICE DAILY *PATIENT NEEDS APPOINTMENT* *EMERGENCY REFILL*      Last office visit with prescribing clinician: 11/2/2021   Last telemedicine visit with prescribing clinician:    Next office visit with prescribing clinician: Visit date not found                         Would you like a call back once the refill request has been completed: [] Yes [] No    If the office needs to give you a call back, can they leave a voicemail: [] Yes [] No    Chris Coffey MA  05/11/23, 11:05 EDT      Patient was to Return in about 3 months (around 2/2/2022) for Recheck.  Called and left vm for patient to return call    OK FOR HUB TO RELAY MESSAGE AND SCHEDULE ANNUAL EXAM

## 2023-05-12 NOTE — TELEPHONE ENCOUNTER
Patient was to Return in about 3 months (around 2/2/2022) for Recheck.  Called and left  for patient to return call     OK FOR HUB TO RELAY MESSAGE AND SCHEDULE ANNUAL EXAM

## 2023-05-15 RX ORDER — ATORVASTATIN CALCIUM 80 MG/1
TABLET, FILM COATED ORAL
Qty: 30 TABLET | Refills: 0 | OUTPATIENT
Start: 2023-05-15

## 2023-05-15 RX ORDER — FLUOXETINE HYDROCHLORIDE 20 MG/1
CAPSULE ORAL
Qty: 60 CAPSULE | Refills: 10 | OUTPATIENT
Start: 2023-05-15

## 2023-05-15 NOTE — TELEPHONE ENCOUNTER
Patient needs to be seen for refills, if she is no longer incarcerated, she needs to schedule a visit, has not been seen in over a year.

## 2023-05-19 DIAGNOSIS — F17.200 CURRENT SMOKER: ICD-10-CM

## 2023-05-19 DIAGNOSIS — J45.20 MILD INTERMITTENT ASTHMA WITHOUT COMPLICATION: ICD-10-CM

## 2023-05-19 DIAGNOSIS — F41.9 ANXIETY: ICD-10-CM

## 2023-05-19 RX ORDER — BUSPIRONE HYDROCHLORIDE 7.5 MG/1
TABLET ORAL
Qty: 60 TABLET | Refills: 10 | OUTPATIENT
Start: 2023-05-19

## 2023-05-19 RX ORDER — BUDESONIDE AND FORMOTEROL FUMARATE DIHYDRATE 160; 4.5 UG/1; UG/1
AEROSOL RESPIRATORY (INHALATION)
Qty: 10.2 G | Refills: 10 | OUTPATIENT
Start: 2023-05-19

## 2023-05-19 RX ORDER — ALBUTEROL SULFATE 90 UG/1
AEROSOL, METERED RESPIRATORY (INHALATION)
Qty: 18 G | Refills: 10 | OUTPATIENT
Start: 2023-05-19

## 2023-06-01 DIAGNOSIS — F33.2 SEVERE EPISODE OF RECURRENT MAJOR DEPRESSIVE DISORDER, WITHOUT PSYCHOTIC FEATURES: ICD-10-CM

## 2023-06-01 NOTE — TELEPHONE ENCOUNTER
Rx Refill Note  Requested Prescriptions     Pending Prescriptions Disp Refills   • atorvastatin (LIPITOR) 80 MG tablet [Pharmacy Med Name: ATORVASTATIN 80 MG TABLET 80 Tablet] 30 tablet 10     Sig: TAKE 1 TABLET BY MOUTH NIGHTLY *PT NEEDS APPT* *EMERGENCY REFILL*   • FLUoxetine (PROzac) 20 MG capsule [Pharmacy Med Name: FLUOXETINE 20 MG CAP 20 Capsule] 60 capsule 10     Sig: TAKE 1 CAPSULE BY MOUTH TWICE DAILY *PATIENT NEEDS APPOINTMENT* *EMERGENCY REFILL*      Last office visit with prescribing clinician: 11/2/2021   Last telemedicine visit with prescribing clinician: Visit date not found   Next office visit with prescribing clinician:       Attempted to call patient for appt to get refill. No Answer. No Vm available.     Okay For [facility] To Schedule for physical.    Elena Clements MA  06/01/23, 13:07 EDT

## 2023-06-02 NOTE — TELEPHONE ENCOUNTER
Rx Refill Note  Requested Prescriptions     Pending Prescriptions Disp Refills   • atorvastatin (LIPITOR) 80 MG tablet [Pharmacy Med Name: ATORVASTATIN 80 MG TABLET 80 Tablet] 30 tablet 10     Sig: TAKE 1 TABLET BY MOUTH NIGHTLY *PT NEEDS APPT* *EMERGENCY REFILL*   • FLUoxetine (PROzac) 20 MG capsule [Pharmacy Med Name: FLUOXETINE 20 MG CAP 20 Capsule] 60 capsule 10     Sig: TAKE 1 CAPSULE BY MOUTH TWICE DAILY *PATIENT NEEDS APPOINTMENT* *EMERGENCY REFILL*      Last office visit with prescribing clinician: 11/2/2021   Last telemedicine visit with prescribing clinician: Visit date not found   Next office visit with prescribing clinician: Visit date not found                         Would you like a call back once the refill request has been completed: [] Yes [] No    If the office needs to give you a call back, can they leave a voicemail: [] Yes [] No    Chris Coffey MA  06/02/23, 08:17 EDT      Patient was to   Return in about 3 months (around 2/2/2022) for Recheck.          Called number on file man who answered said it was the wrong number     OK FOR HUB TO RELAY MESSAGE AND SCHEDULE FOLLOW UP APPOINTMENT

## 2023-06-05 RX ORDER — FLUOXETINE HYDROCHLORIDE 20 MG/1
CAPSULE ORAL
Qty: 30 CAPSULE | Refills: 0 | Status: SHIPPED | OUTPATIENT
Start: 2023-06-05

## 2023-06-05 RX ORDER — ATORVASTATIN CALCIUM 80 MG/1
TABLET, FILM COATED ORAL
Qty: 30 TABLET | Refills: 0 | Status: SHIPPED | OUTPATIENT
Start: 2023-06-05

## 2023-06-05 NOTE — TELEPHONE ENCOUNTER
3rd attempt    Patient was to   Return in about 3 months (around 2/2/2022) for Recheck.            Called number on file man who answered said it was the wrong number      OK FOR HUB TO RELAY MESSAGE AND SCHEDULE FOLLOW UP APPOINTMENT

## 2025-03-20 NOTE — TELEPHONE ENCOUNTER
AUDIOMETRIC EVALUATION      Name:  Gini Edmondson  :  1948  Age:  76 y.o.  Date of Evaluation:  2025       History:   is seen today for a hearing evaluation due to dizziness.    Audiologic Information:  Concerns for Hearing: Some  PETs: No  Other otologic surgical history: None  Aural Pressure/Fullness: No  Otalgia: None  Otorrhea: No  Tinnitus: Periodic  Dizziness: Periodic  Noise Exposure: No  Family history of hearing loss: None  Head trauma requiring hospital stay: No  Chemotherapy: No  Other significant history: None    EVALUATION:    See audiogram    RESULTS:    Otoscopic Evaluation:        NOTE: Testing completed after ears were examined by the nurse practitioner.    Tympanometry (226 Hz):  Right: Type A  Left: Type A    IMPRESSIONS:  Pure tone thresholds for the right ear indicates sensorineural hearing loss.  Severe loss at 8K hertz.  Pure tone thresholds for the left ear indicates a mild to moderate mixed hearing loss.  Patient was counseled with regard to the findings.    RECOMMENDATIONS/PLAN:  Follow-up recommendations of the nurse practitioner  Hearing aid evaluation and counseling upon medical clearance and patient motivation. Patient provided contact information for an audiologist in [facility].  Discussed results and recommendations with patient. Questions were addressed and the patient was encouraged to contact our department should concerns arise.          Jaun Julian M.S, [facility]  Licensed Audiologist   Pt. Needs appt for Physical.

## (undated) DEVICE — FLEXIBLE YANKAUER,MEDIUM TIP, NO VACUUM CONTROL: Brand: ARGYLE

## (undated) DEVICE — SUT VIC 3/0 SH 27IN J416H

## (undated) DEVICE — SYR LL TP 10ML STRL

## (undated) DEVICE — DRILL, 2.0 X 110MM, SOLID, MEASURING, AO: Brand: BABY GORILLA®/GORILLA® PLATING SYSTEM

## (undated) DEVICE — SUT VIC 2/0 SH 27IN

## (undated) DEVICE — BNDG ESMARK 4IN 9FT LF STRL BLU

## (undated) DEVICE — P28, DRILL, Ø1.3 X 100MM (Ø2.0), SOLID, SS: Brand: BABY GORILLA®/GORILLA® PLATING SYSTEM

## (undated) DEVICE — DRSNG WND GZ CURAD OIL EMULSION 3X3IN STRL

## (undated) DEVICE — TUBING, SUCTION, 1/4" X 12', STRAIGHT: Brand: MEDLINE

## (undated) DEVICE — PRECISION THIN (5.5 X 0.38 X 18.0MM)

## (undated) DEVICE — PAD GRND REM POLYHESIVE A/ DISP

## (undated) DEVICE — BNDG ELAS MATRX V/CLS 4IN 5YD LF

## (undated) DEVICE — SHEET,DRAPE,70X100,STERILE: Brand: MEDLINE

## (undated) DEVICE — GLV SURG SENSICARE GREEN W/ALOE PF LF 8 STRL

## (undated) DEVICE — BANDAGE,GAUZE,CONFORMING,4"X75",STRL,LF: Brand: MEDLINE

## (undated) DEVICE — 4-PORT MANIFOLD: Brand: NEPTUNE 2

## (undated) DEVICE — BNDG ELAS ELITE V/CLOSE 4IN 5YD LF STRL

## (undated) DEVICE — PK EXTRM LOWR 20

## (undated) DEVICE — BABY GORILLA, SCREW, Ø2.0 X 12MM, NON-LOCKING, TIA
Type: IMPLANTABLE DEVICE | Site: FOOT | Status: NON-FUNCTIONAL
Brand: BABY GORILLA/GORILLA PLATING SYSTEM
Removed: 2017-12-04

## (undated) DEVICE — DISPOSABLE TOURNIQUET CUFF SINGLE BLADDER, SINGLE PORT AND QUICK CONNECT CONNECTOR: Brand: COLOR CUFF

## (undated) DEVICE — SMARTGOWN SURGICAL GOWN, 2XL: Brand: CONVERTORS

## (undated) DEVICE — CUFF SCD HEMOFORCE SEQ CALF STD MD

## (undated) DEVICE — SPNG GZ WOVN 4X4IN 12PLY 10/BX STRL

## (undated) DEVICE — THIN OFFSET (9.0 X 0.38 X 25.0MM)

## (undated) DEVICE — ST IRR CYSTO W/SPK 77IN LF

## (undated) DEVICE — 1000 S-DRAPE TOWEL DRAPE 10/BX: Brand: STERI-DRAPE™

## (undated) DEVICE — BLD SCLPL BEAVR MINI STR 2BVL 180D LF

## (undated) DEVICE — GLV SURG BIOGEL PI ULTRATOUCH G SZ7.5 LF

## (undated) DEVICE — SUT MNCRYL 3/0 SH 27 IN Y416H

## (undated) DEVICE — SUT ETHLN 3/0 FS1 663G